# Patient Record
Sex: FEMALE | Race: WHITE | Employment: FULL TIME | URBAN - METROPOLITAN AREA
[De-identification: names, ages, dates, MRNs, and addresses within clinical notes are randomized per-mention and may not be internally consistent; named-entity substitution may affect disease eponyms.]

---

## 2017-01-05 RX ORDER — MONTELUKAST SODIUM 10 MG/1
TABLET ORAL
Qty: 30 TABLET | Refills: 2 | Status: SHIPPED | OUTPATIENT
Start: 2017-01-05 | End: 2017-04-19

## 2017-01-05 NOTE — TELEPHONE ENCOUNTER
Requesting Montelukast refill    Refill Protocol Appointment Criteria  · Appointment scheduled in the past 6 months or in the next 3 months  Recent Visits       Provider Department Primary Dx    1 month ago Sky Rai, [facility] Jaleel Paniagua

## 2017-01-20 NOTE — TELEPHONE ENCOUNTER
Pt is calling requesting a refill on medication Rylie CAPPS pt state that she is out of town and need it to go to [facility] in [address] [facility] [phone]

## 2017-01-25 RX ORDER — FEXOFENADINE HCL AND PSEUDOEPHEDRINE HCI 60; 120 MG/1; MG/1
1 TABLET, EXTENDED RELEASE ORAL 2 TIMES DAILY
Qty: 30 TABLET | Refills: 0 | Status: SHIPPED | OUTPATIENT
Start: 2017-01-25 | End: 2017-10-30

## 2017-01-30 ENCOUNTER — TELEPHONE (OUTPATIENT)
Dept: OBGYN CLINIC | Facility: CLINIC | Age: 37
End: 2017-01-30

## 2017-01-30 ENCOUNTER — TELEPHONE (OUTPATIENT)
Dept: FAMILY MEDICINE CLINIC | Facility: CLINIC | Age: 37
End: 2017-01-30

## 2017-01-30 DIAGNOSIS — B37.9 YEAST INFECTION: Primary | ICD-10-CM

## 2017-01-30 RX ORDER — FLUCONAZOLE 150 MG/1
150 TABLET ORAL ONCE
Qty: 2 TABLET | Refills: 0 | Status: SHIPPED | OUTPATIENT
Start: 2017-01-30 | End: 2017-01-30

## 2017-01-30 NOTE — TELEPHONE ENCOUNTER
Managed Care- Prior to forwarding to the doctor. Is this physician in the [facility] network?  Or should patient be referred to another allergist?

## 2017-01-30 NOTE — TELEPHONE ENCOUNTER
Dr Aylin Jin is not a participating provider. [facility] Akil Garnica.      Thank you, Cm

## 2017-01-30 NOTE — TELEPHONE ENCOUNTER
ASKING FOR REFERRAL TO AN ALLERGIST.       PATIENT HAS HMO INSURANCE     DOCTOR [facility]  PHONE # [phone]

## 2017-01-30 NOTE — TELEPHONE ENCOUNTER
[id number]. Attempted to contact the patient to inform her of Managed Care information. She will need to see in network provider Dr. Dey Said.  Why is she requesting to see an allergist?

## 2017-01-30 NOTE — TELEPHONE ENCOUNTER
Please inform patient I sent rx to her pharmacy. She should call if she does not experience significant relief after the second dose. Thanks.

## 2017-01-30 NOTE — TELEPHONE ENCOUNTER
Pt is requesting a refill of Rx Diflucan. Pt c/o vaginal itching and discomefort on the inside, white vaginal d/c and no odor. Pt was advised will send request to [facility]. Pt agrees with plan.

## 2017-01-31 NOTE — TELEPHONE ENCOUNTER
Pt returning phone call from N. ..please advise pt state that she can see Dr. Akil Garnica. Trey Rizo

## 2017-02-14 ENCOUNTER — TELEPHONE (OUTPATIENT)
Dept: FAMILY MEDICINE CLINIC | Facility: CLINIC | Age: 37
End: 2017-02-14

## 2017-02-15 ENCOUNTER — TELEPHONE (OUTPATIENT)
Dept: FAMILY MEDICINE CLINIC | Facility: CLINIC | Age: 37
End: 2017-02-15

## 2017-02-15 DIAGNOSIS — M25.562 LEFT KNEE PAIN, UNSPECIFIED CHRONICITY: Primary | ICD-10-CM

## 2017-02-15 NOTE — TELEPHONE ENCOUNTER
Reason for Call/Chief Complaint: pain in left knee when running. Pt asking for referral to ortho, Dr. Quentin San, pt state she saw this doctor when she was having foot pain.    Onset: January  Nursing Assessment/Associated Symptoms: state pain only present wh

## 2017-02-16 NOTE — TELEPHONE ENCOUNTER
Pt stating that the Dr who was referred to her doesn't assist in what she needs done. Pt now saying that she needs to be referred to a sports medicine orthopedic who works on the knee.  Pt said there were a few located in [address] [place] [address] Street which is covered by her insu

## 2017-02-22 NOTE — TELEPHONE ENCOUNTER
Spoke to pt, stated a PT ref cannot be generated without an order for PCP or ortho.  Request cancelled

## 2017-02-24 ENCOUNTER — OFFICE VISIT (OUTPATIENT)
Dept: ORTHOPEDICS CLINIC | Facility: CLINIC | Age: 37
End: 2017-02-24

## 2017-02-24 ENCOUNTER — HOSPITAL ENCOUNTER (OUTPATIENT)
Dept: GENERAL RADIOLOGY | Facility: HOSPITAL | Age: 37
Discharge: HOME | End: 2017-02-24
Attending: ORTHOPAEDIC SURGERY
Payer: COMMERCIAL

## 2017-02-24 DIAGNOSIS — M22.42 PATELLA, CHONDROMALACIA, LEFT: Primary | ICD-10-CM

## 2017-02-24 DIAGNOSIS — M25.562 LEFT KNEE PAIN, UNSPECIFIED CHRONICITY: ICD-10-CM

## 2017-02-24 PROCEDURE — 99243 OFF/OP CNSLTJ NEW/EST LOW 30: CPT | Performed by: ORTHOPAEDIC SURGERY

## 2017-02-24 PROCEDURE — 73565 X-RAY EXAM OF KNEES: CPT

## 2017-02-24 PROCEDURE — 73560 X-RAY EXAM OF KNEE 1 OR 2: CPT

## 2017-02-24 PROCEDURE — 99212 OFFICE O/P EST SF 10 MIN: CPT | Performed by: ORTHOPAEDIC SURGERY

## 2017-02-24 RX ORDER — FLUCONAZOLE 150 MG/1
TABLET ORAL
Refills: 0 | COMMUNITY
Start: 2017-02-15 | End: 2017-02-24 | Stop reason: ALTCHOICE

## 2017-02-24 NOTE — PROGRESS NOTES
2/24/2017  Ricky Oliver  109/1980  39year old   female  Jasmine Castillo MD    HPI:   Patient presents with:  Knee Pain: Left - she is a runner - onset about December - no specific injury - has pain in the anterior aspect of the knee and is rated as   2013    Comment 2013 . 1st degree perineal laceration.   Pop Shearing    OTHER SURGICAL HISTORY      Comment Tummy Tuck    OTHER SURGICAL HISTORY      Comment Urogyn repair      Family History   Problem Relation Age of Onset   • D dislocation.     ASSESSMENT AND PLAN:   Patella, chondromalacia, left  (primary encounter diagnosis)  Left knee pain, unspecified chronicity    The risks, indications, benefits, and procedures of both operative and non-operative treatment were discussed wit

## 2017-03-08 ENCOUNTER — OFFICE VISIT (OUTPATIENT)
Dept: PHYSICAL THERAPY | Facility: HOSPITAL | Age: 37
End: 2017-03-08
Attending: ORTHOPAEDIC SURGERY
Payer: COMMERCIAL

## 2017-03-08 DIAGNOSIS — M22.42 PATELLA, CHONDROMALACIA, LEFT: Primary | ICD-10-CM

## 2017-03-08 PROCEDURE — 97530 THERAPEUTIC ACTIVITIES: CPT

## 2017-03-08 PROCEDURE — 97162 PT EVAL MOD COMPLEX 30 MIN: CPT

## 2017-03-08 NOTE — PROGRESS NOTES
LOWER EXTREMITY EVALUATION:   Referring Physician: Dr. Sienna Rosales  Diagnosis: Patella, chondromalacia, left (M22.42)     Date of Onset: January 2017 Date of Service: 3/8/2017     PATIENT SUMMARY   The patient is an active 38 y/o female who presented with L i 4+/5; add 5/5; L knee ext 4+/5 (eccentric 4/5); flex 5/5; L ankle DF 5/5    Special tests: L single leg squat (medial collapse)      Today’s Treatment and Response:  Patient education provided on diagnosis; pain/edema management; foot mechanics and orthoti Date____________________    Certification From: 4/5/5145  To:6/6/2017

## 2017-03-13 ENCOUNTER — OFFICE VISIT (OUTPATIENT)
Dept: PHYSICAL THERAPY | Facility: HOSPITAL | Age: 37
End: 2017-03-13
Attending: ORTHOPAEDIC SURGERY
Payer: COMMERCIAL

## 2017-03-13 PROCEDURE — 97116 GAIT TRAINING THERAPY: CPT

## 2017-03-13 PROCEDURE — 97110 THERAPEUTIC EXERCISES: CPT

## 2017-03-13 NOTE — PROGRESS NOTES
DX: Patella, chondromalacia, left (M22.42)    Authorized # of Visits:  2         Next MD visit: none scheduled  Fall Risk: standard         Precautions: n/a           Medication Changes since last visit?: No  Subjective: \"I have no pain all the time, with

## 2017-03-15 RX ORDER — FLUCONAZOLE 150 MG/1
150 TABLET ORAL DAILY
Qty: 2 TABLET | Refills: 0 | Status: SHIPPED | OUTPATIENT
Start: 2017-03-15 | End: 2017-03-20

## 2017-03-15 RX ORDER — AZITHROMYCIN 250 MG/1
TABLET, FILM COATED ORAL
Qty: 6 TABLET | Refills: 0 | OUTPATIENT
Start: 2017-03-15

## 2017-03-15 NOTE — TELEPHONE ENCOUNTER
Left VM notifying pt prescription has been refilled and to call back if any questions or if no improvement in symptoms.

## 2017-03-15 NOTE — TELEPHONE ENCOUNTER
Actions Requested: pt requesting Diflucan refill, not Azithromax as noted in meds & orders.  States she has a yeast infection, states she had Diflucan 2 tabs prescribed for these symptoms  Situation/Background   Problem: pt thinks she has a yeast infection

## 2017-03-17 ENCOUNTER — APPOINTMENT (OUTPATIENT)
Dept: PHYSICAL THERAPY | Facility: HOSPITAL | Age: 37
End: 2017-03-17
Attending: ORTHOPAEDIC SURGERY
Payer: COMMERCIAL

## 2017-03-20 ENCOUNTER — OFFICE VISIT (OUTPATIENT)
Dept: OBGYN CLINIC | Facility: CLINIC | Age: 37
End: 2017-03-20

## 2017-03-20 VITALS
BODY MASS INDEX: 18 KG/M2 | HEART RATE: 60 BPM | DIASTOLIC BLOOD PRESSURE: 59 MMHG | SYSTOLIC BLOOD PRESSURE: 93 MMHG | WEIGHT: 102 LBS

## 2017-03-20 DIAGNOSIS — N89.8 VAGINAL DISCHARGE: Primary | ICD-10-CM

## 2017-03-20 PROCEDURE — 99212 OFFICE O/P EST SF 10 MIN: CPT | Performed by: ADVANCED PRACTICE MIDWIFE

## 2017-03-20 NOTE — PROGRESS NOTES
HPI:   Jose Coronado is a 39year old female who presents for a gyne annual physical exam.    Wt Readings from Last 3 Encounters:  03/20/17 : 102 lb (46.267 kg)  11/28/16 : 108 lb (48.988 kg)  11/28/16 : 108 lb (48.988 kg)    Body mass index is 17.78 kg/ Paternal Grandfather 79     CAD   • Cancer Paternal Grandfather    • Cancer Maternal Grandfather 36      Social History:     Smoking Status: Former Smoker                   Packs/Day: 0.50  Years: 10        Types: Cigarettes    Smokeless Status: Never Used

## 2017-03-21 ENCOUNTER — OFFICE VISIT (OUTPATIENT)
Dept: PHYSICAL THERAPY | Facility: HOSPITAL | Age: 37
End: 2017-03-21
Attending: ORTHOPAEDIC SURGERY
Payer: COMMERCIAL

## 2017-03-21 PROCEDURE — 97110 THERAPEUTIC EXERCISES: CPT

## 2017-03-21 NOTE — PROGRESS NOTES
DX: Patella, chondromalacia, left (M22.42)    Authorized # of Visits:  3         Next MD visit: none scheduled  Fall Risk: standard         Precautions: n/a           Medication Changes since last visit?: No  Subjective: \"I have no left knee pain today.  Ericka Lan

## 2017-03-22 LAB
GENITAL VAGINOSIS SCREEN: NEGATIVE
TRICHOMONAS SCREEN: NEGATIVE

## 2017-03-23 ENCOUNTER — OFFICE VISIT (OUTPATIENT)
Dept: PHYSICAL THERAPY | Facility: HOSPITAL | Age: 37
End: 2017-03-23
Attending: ORTHOPAEDIC SURGERY
Payer: COMMERCIAL

## 2017-03-23 ENCOUNTER — TELEPHONE (OUTPATIENT)
Dept: OBGYN CLINIC | Facility: CLINIC | Age: 37
End: 2017-03-23

## 2017-03-23 PROCEDURE — 97140 MANUAL THERAPY 1/> REGIONS: CPT

## 2017-03-23 PROCEDURE — 97110 THERAPEUTIC EXERCISES: CPT

## 2017-03-23 NOTE — TELEPHONE ENCOUNTER
----- Message from Cesar Eden CNM sent at 3/22/2017 12:40 PM CDT -----  Normal vaginal cultures. Recommend probiotic as we discussed during her visit. Please call to inform.

## 2017-03-23 NOTE — PROGRESS NOTES
DX: Patella, chondromalacia, left (M22.42)    Authorized # of Visits:  4         Next MD visit: none scheduled  Fall Risk: standard         Precautions: n/a           Medication Changes since last visit?: No  Subjective: \"I have no left knee pain today.  O min  Total Treatment Time: 45 min

## 2017-03-24 NOTE — TELEPHONE ENCOUNTER
Called and spoke with pt regarding normal vaginal cultures and to start taking probiotic. Pt reports that she has started probiotic and symptoms are gone. Reports no further questions.

## 2017-03-27 ENCOUNTER — TELEPHONE (OUTPATIENT)
Dept: FAMILY MEDICINE CLINIC | Facility: CLINIC | Age: 37
End: 2017-03-27

## 2017-03-27 RX ORDER — FEXOFENADINE HCL AND PSEUDOEPHEDRINE HCI 60; 120 MG/1; MG/1
1 TABLET, EXTENDED RELEASE ORAL 2 TIMES DAILY
Qty: 60 TABLET | Refills: 0 | Status: SHIPPED | OUTPATIENT
Start: 2017-03-27 | End: 2017-05-01

## 2017-03-27 NOTE — TELEPHONE ENCOUNTER
Reviewed request with pt as noted prescription entered as generic form of Allegra D. Pt states she picked up a 10-day supply of brand name med as [facility] pharmacy did not have generic for Allegra D.    Spoke with [facility] pharmacist, pharmacist states [facility] does not c

## 2017-03-27 NOTE — TELEPHONE ENCOUNTER
Notified pt prescription faxed to [facility] as requested. Pt had no further questions at this time.

## 2017-03-27 NOTE — TELEPHONE ENCOUNTER
Pt states Allegra D was sent to her pharmacy but is too expensive, Requesting to send generic to [facility] in [place]. Please Advise.

## 2017-03-28 ENCOUNTER — OFFICE VISIT (OUTPATIENT)
Dept: PHYSICAL THERAPY | Facility: HOSPITAL | Age: 37
End: 2017-03-28
Attending: ORTHOPAEDIC SURGERY
Payer: COMMERCIAL

## 2017-03-28 ENCOUNTER — TELEPHONE (OUTPATIENT)
Dept: FAMILY MEDICINE CLINIC | Facility: CLINIC | Age: 37
End: 2017-03-28

## 2017-03-28 DIAGNOSIS — M21.611 BUNION, RIGHT FOOT: Primary | ICD-10-CM

## 2017-03-28 PROCEDURE — 97110 THERAPEUTIC EXERCISES: CPT

## 2017-03-28 NOTE — TELEPHONE ENCOUNTER
Pt is requesting a referral to see Dr. Brian Mosquera for a bunion on her right foot. Would Dr. Jeri Mai like to generate this referral? Please contact the pt when this has been done.  Thank you

## 2017-03-28 NOTE — TELEPHONE ENCOUNTER
Reviewed referral request with pt. Pt states she has seen Dr. Svetlana Amador for the bunion and needs to follow up with him so would like referral as soon as possible.     Referral pended for review, please advise

## 2017-03-28 NOTE — PROGRESS NOTES
DX: Patella, chondromalacia, left (M22.42)    Authorized # of Visits:  4         Next MD visit: none scheduled  Fall Risk: standard         Precautions: n/a           Medication Changes since last visit?: No  Subjective: \"I have no left knee pain today.  I knee pain necessary to run marathon in October 2017          Charges: tx 3      Total Timed Treatment: 42 min  Total Treatment Time: 45 min

## 2017-03-30 NOTE — TELEPHONE ENCOUNTER
Looks like referral was sent via [address] [place] [address] [id number]. No further action needed.

## 2017-04-03 ENCOUNTER — OFFICE VISIT (OUTPATIENT)
Dept: PHYSICAL THERAPY | Facility: HOSPITAL | Age: 37
End: 2017-04-03
Attending: ORTHOPAEDIC SURGERY
Payer: COMMERCIAL

## 2017-04-03 PROCEDURE — 97110 THERAPEUTIC EXERCISES: CPT

## 2017-04-03 NOTE — PROGRESS NOTES
DX: Patella, chondromalacia, left (M22.42)    Authorized # of Visits: 5         Next MD visit: none scheduled  Fall Risk: standard         Precautions: n/a           Medication Changes since last visit?: No  Subjective: \"I feel sore after last visit on my Total Timed Treatment: 42 min  Total Treatment Time: 45 min

## 2017-04-05 ENCOUNTER — APPOINTMENT (OUTPATIENT)
Dept: PHYSICAL THERAPY | Facility: HOSPITAL | Age: 37
End: 2017-04-05
Attending: ORTHOPAEDIC SURGERY
Payer: COMMERCIAL

## 2017-04-20 RX ORDER — MONTELUKAST SODIUM 10 MG/1
TABLET ORAL
Qty: 30 TABLET | Refills: 2 | Status: SHIPPED | OUTPATIENT
Start: 2017-04-20 | End: 2017-07-24

## 2017-04-24 ENCOUNTER — TELEPHONE (OUTPATIENT)
Dept: FAMILY MEDICINE CLINIC | Facility: CLINIC | Age: 37
End: 2017-04-24

## 2017-04-24 NOTE — TELEPHONE ENCOUNTER
Pt calling requesting referral for allergy for continued allergies despite meds. Pt also requesting referral for possible varicose veins on right leg.   Please call when approved

## 2017-05-01 ENCOUNTER — OFFICE VISIT (OUTPATIENT)
Dept: OBGYN CLINIC | Facility: CLINIC | Age: 37
End: 2017-05-01

## 2017-05-01 VITALS
HEART RATE: 46 BPM | WEIGHT: 103 LBS | BODY MASS INDEX: 18 KG/M2 | SYSTOLIC BLOOD PRESSURE: 95 MMHG | DIASTOLIC BLOOD PRESSURE: 60 MMHG

## 2017-05-01 DIAGNOSIS — N89.8 VAGINAL DISCHARGE: Primary | ICD-10-CM

## 2017-05-01 PROCEDURE — 99213 OFFICE O/P EST LOW 20 MIN: CPT | Performed by: ADVANCED PRACTICE MIDWIFE

## 2017-05-01 NOTE — PROGRESS NOTES
HPI:   Héctor Moffett is a 39year old female who presents for a gyne problem exam. Same problem as before, feeling uncomfortable and slightly itchy with some vaginal discharge. Last visit 6 weeks ago with normal culture results. No change in symptoms.  In SHOWS MGFA, NOT MGMA   • Dementia Paternal Grandmother 80     Alzheimer's disease   • Heart Disease Paternal Grandfather 79     CAD   • Cancer Paternal Grandfather    • Cancer Maternal Grandfather 36      Social History:     Smoking Status: Former Smoker benadryl, melatonin and relaxation exercises to assist.       Luis Rollins CNM  5/1/2017  4:02 PM

## 2017-05-03 ENCOUNTER — TELEPHONE (OUTPATIENT)
Dept: OBGYN CLINIC | Facility: CLINIC | Age: 37
End: 2017-05-03

## 2017-05-03 DIAGNOSIS — B37.3 YEAST VAGINITIS: Primary | ICD-10-CM

## 2017-05-03 RX ORDER — FLUCONAZOLE 150 MG/1
150 TABLET ORAL
Qty: 2 TABLET | Refills: 0 | Status: SHIPPED | OUTPATIENT
Start: 2017-05-03 | End: 2017-05-07

## 2017-05-03 NOTE — TELEPHONE ENCOUNTER
----- Message from Venecia Blanchard CNM sent at 5/3/2017 12:00 PM CDT -----  Positive for yeast. Rx diflucan sent in with 2 doses.  Please call to inform

## 2017-05-03 NOTE — TELEPHONE ENCOUNTER
Informed pt that she is positive for yeast infection. Informed pt that a prescription has been sent to her pharmacy for diflucan. Pt verbalized understanding and agrees with plan.

## 2017-05-03 NOTE — TELEPHONE ENCOUNTER
Informed pt that she is positive for yeast infection. Informed pt that Diflucan was sent to her pharmacy. Pt verbalized understanding and agrees with plan.

## 2017-05-19 ENCOUNTER — OFFICE VISIT (OUTPATIENT)
Dept: FAMILY MEDICINE CLINIC | Facility: CLINIC | Age: 37
End: 2017-05-19

## 2017-05-19 DIAGNOSIS — I83.90 VARICOSE VEIN OF LEG: Primary | ICD-10-CM

## 2017-05-19 DIAGNOSIS — L98.9 SKIN LESION: ICD-10-CM

## 2017-05-19 PROCEDURE — 99212 OFFICE O/P EST SF 10 MIN: CPT | Performed by: FAMILY MEDICINE

## 2017-05-19 PROCEDURE — 99214 OFFICE O/P EST MOD 30 MIN: CPT | Performed by: FAMILY MEDICINE

## 2017-05-19 NOTE — PROGRESS NOTES
HPI:    Viviana Brown is a 39year old female presents to clinic with complaints of a dilated vein on her left LE. States that vein looks puffy, has been like this for awhile.  In the past month she has noticed that it is tender to touch, also she feels NIGHTLY. Disp: 30 tablet Rfl: 2   Fexofenadine-Pseudoephed ER  MG Oral Tablet 12 Hr Take 1 tablet by mouth 2 (two) times daily. Disp: 30 tablet Rfl: 0   Ascorbic Acid (VITAMIN C OR) Take 1 Package by mouth daily.  Disp:  Rfl:    Multiple Vitamins-Mine

## 2017-05-30 ENCOUNTER — TELEPHONE (OUTPATIENT)
Dept: FAMILY MEDICINE CLINIC | Facility: CLINIC | Age: 37
End: 2017-05-30

## 2017-05-30 DIAGNOSIS — M25.562 CHRONIC PAIN OF LEFT KNEE: Primary | ICD-10-CM

## 2017-05-30 DIAGNOSIS — G89.29 CHRONIC PAIN OF LEFT KNEE: Primary | ICD-10-CM

## 2017-05-30 NOTE — TELEPHONE ENCOUNTER
Patient reports that she is in need of referral to follow-up with Dr Dexter Fabry due to the completion of physical therapy.

## 2017-07-08 ENCOUNTER — TELEPHONE (OUTPATIENT)
Dept: OBGYN CLINIC | Facility: CLINIC | Age: 37
End: 2017-07-08

## 2017-07-09 RX ORDER — FLUCONAZOLE 150 MG/1
150 TABLET ORAL DAILY
Qty: 2 TABLET | Refills: 0 | Status: SHIPPED | OUTPATIENT
Start: 2017-07-09 | End: 2017-07-11

## 2017-07-21 ENCOUNTER — TELEPHONE (OUTPATIENT)
Dept: FAMILY MEDICINE CLINIC | Facility: CLINIC | Age: 37
End: 2017-07-21

## 2017-07-21 DIAGNOSIS — M79.673 PAIN OF FOOT, UNSPECIFIED LATERALITY: Primary | ICD-10-CM

## 2017-07-21 NOTE — TELEPHONE ENCOUNTER
Pt calling and stts she needs a referral for a f/u visit with her podiatrist.  Pt could not recall the name of the dr. Bety Pinon pt that the referral from previous was for dr. Luanne Pope. Pt has not set up an appt yet.   Please call once referral is place

## 2017-07-22 ENCOUNTER — OFFICE VISIT (OUTPATIENT)
Dept: FAMILY MEDICINE CLINIC | Facility: CLINIC | Age: 37
End: 2017-07-22

## 2017-07-22 ENCOUNTER — TELEPHONE (OUTPATIENT)
Dept: INTERNAL MEDICINE CLINIC | Facility: CLINIC | Age: 37
End: 2017-07-22

## 2017-07-22 VITALS
DIASTOLIC BLOOD PRESSURE: 80 MMHG | BODY MASS INDEX: 19 KG/M2 | WEIGHT: 106.63 LBS | HEART RATE: 70 BPM | SYSTOLIC BLOOD PRESSURE: 133 MMHG | RESPIRATION RATE: 16 BRPM | TEMPERATURE: 98 F

## 2017-07-22 DIAGNOSIS — M79.672 ACUTE PAIN OF LEFT FOOT: Primary | ICD-10-CM

## 2017-07-22 PROCEDURE — 99212 OFFICE O/P EST SF 10 MIN: CPT | Performed by: FAMILY MEDICINE

## 2017-07-22 PROCEDURE — 99214 OFFICE O/P EST MOD 30 MIN: CPT | Performed by: FAMILY MEDICINE

## 2017-07-22 RX ORDER — CETIRIZINE HYDROCHLORIDE 10 MG/1
10 TABLET ORAL DAILY
COMMUNITY
End: 2017-10-30

## 2017-07-22 NOTE — PROGRESS NOTES
HPI:    Patient ID: Shamir Kohler is a 44year old female. Foot Pain    The pain is present in the left foot. This is a new problem. The current episode started yesterday. There has been no history of extremity trauma (marathon training).  The problem Dorsalis pedis pulses are 2+ on the right side, and 2+ on the left side. Carotid bruit not present. Pulmonary/Chest: Effort normal and breath sounds normal. No respiratory distress.    Musculoskeletal:        Feet:    Region of discomfort as depicted

## 2017-07-22 NOTE — TELEPHONE ENCOUNTER
Actions Requested: OV appt requested for today/ appt made for today at 9:20am  Problem: left foot pain (top of foot) 10/10 on pain scale with ambulation  Onset and Timing: yesterday  Associated Symptoms: none  Aggravating by: ambulation  Alleviated by: non pain not relieved by pain medication      - Pain lasts over 7 days      - You become worse

## 2017-07-24 ENCOUNTER — HOSPITAL ENCOUNTER (OUTPATIENT)
Dept: GENERAL RADIOLOGY | Facility: HOSPITAL | Age: 37
Discharge: HOME | End: 2017-07-24
Attending: FAMILY MEDICINE
Payer: COMMERCIAL

## 2017-07-24 ENCOUNTER — TELEPHONE (OUTPATIENT)
Dept: PODIATRY CLINIC | Facility: CLINIC | Age: 37
End: 2017-07-24

## 2017-07-24 DIAGNOSIS — M79.672 ACUTE PAIN OF LEFT FOOT: ICD-10-CM

## 2017-07-24 PROCEDURE — 73630 X-RAY EXAM OF FOOT: CPT | Performed by: FAMILY MEDICINE

## 2017-07-24 NOTE — TELEPHONE ENCOUNTER
Spoke to pt. States she started having dorsal left foot pain 2 days ago, Saturday. Saw PCP and xray was ordered but not completed at this time. States she will get xray today. Rates pain 2-3/10 with walking. Denies pain while sitting.  Denies swelling or br

## 2017-07-24 NOTE — TELEPHONE ENCOUNTER
Pt currently has appt on 8/3 with SCR, asking if she can be seen sooner, states she is training for a marathon and has to follow a training schedule, pt currently has L foot pain. Pls advise thank you.

## 2017-07-25 ENCOUNTER — TELEPHONE (OUTPATIENT)
Dept: FAMILY MEDICINE CLINIC | Facility: CLINIC | Age: 37
End: 2017-07-25

## 2017-07-25 DIAGNOSIS — M79.672 ACUTE FOOT PAIN, LEFT: Primary | ICD-10-CM

## 2017-07-25 NOTE — TELEPHONE ENCOUNTER
Actions Requested: Advice regarding allergy meds. Would like Singulair changed to a different med. Also requesting MRI to diagnose her foot injury since x-ray was negative.   Problem: increased anxiety   Onset and Timing: Pt unable to pin point timing but a

## 2017-07-25 NOTE — TELEPHONE ENCOUNTER
Patient can try allegra plain 180 mg OTC to see if this works. No MRI until she is seen by ortho. Referral on chart. Call patient.

## 2017-07-25 NOTE — TELEPHONE ENCOUNTER
Spoke to pt, informed her of Dr. walters. She has an appt with podiatry tomorrow. She will wait until after the appt to consider ortho. She was instructed to try Allegra 180mg otc in place of Singulair. Pt voices understanding and agrees with plan.

## 2017-07-25 NOTE — TELEPHONE ENCOUNTER
Patient is experiencing symptoms  - changes in mood - with taking the medication listed below. Please advise.        Singulair

## 2017-07-26 ENCOUNTER — OFFICE VISIT (OUTPATIENT)
Dept: PODIATRY CLINIC | Facility: CLINIC | Age: 37
End: 2017-07-26

## 2017-07-26 ENCOUNTER — TELEPHONE (OUTPATIENT)
Dept: PODIATRY CLINIC | Facility: CLINIC | Age: 37
End: 2017-07-26

## 2017-07-26 ENCOUNTER — TELEPHONE (OUTPATIENT)
Dept: INTERNAL MEDICINE CLINIC | Facility: CLINIC | Age: 37
End: 2017-07-26

## 2017-07-26 ENCOUNTER — TELEPHONE (OUTPATIENT)
Dept: FAMILY MEDICINE CLINIC | Facility: CLINIC | Age: 37
End: 2017-07-26

## 2017-07-26 DIAGNOSIS — M84.375A STRESS FRACTURE OF METATARSAL BONE, LEFT, INITIAL ENCOUNTER: Primary | ICD-10-CM

## 2017-07-26 PROCEDURE — 99243 OFF/OP CNSLTJ NEW/EST LOW 30: CPT | Performed by: PODIATRIST

## 2017-07-26 PROCEDURE — 99212 OFFICE O/P EST SF 10 MIN: CPT | Performed by: PODIATRIST

## 2017-07-26 RX ORDER — MONTELUKAST SODIUM 10 MG/1
TABLET ORAL
Qty: 30 TABLET | Refills: 2 | Status: SHIPPED | OUTPATIENT
Start: 2017-07-26 | End: 2017-10-30

## 2017-07-26 NOTE — TELEPHONE ENCOUNTER
Pt states Didi Kellogg next appt is 8/14, Pt is requesting the Dr. Clifford Mancini to contact that Dr. Yury Recio office to see if he could get her a sooner.

## 2017-07-26 NOTE — TELEPHONE ENCOUNTER
Patient calling she was stating diagnosed this morning with stress fracture in her foot.  Training for [employer], frustrated because she wanted to see Dr Victor Hugo Styles doctor she saw last year, however Jaja Mao has no appointment until August 14 [facility]

## 2017-07-26 NOTE — TELEPHONE ENCOUNTER
Refill Protocol Appointment Criteria: Refilled per protocol    · Appointment scheduled in the past 12 months or in the next 3 months  Recent Outpatient Visits            4 days ago Acute pain of left foot    [facility] [place]

## 2017-07-26 NOTE — PROGRESS NOTES
HPI:    Patient ID: Stephanie Boggs is a 44year old female. HPI  This pleasant 41-year-old female presents as a new patient to me on consult from [address] [place] [address]. This patient's chief complaint is pain associated with the dorsal aspect of the left foot. no pain on passive motion of any of the metatarsophalangeal joints but there is a point tender spot along the course of the shaft of the seeming fourth metatarsal.  It is point tender and extremely sore.   I was not able to elicit any other areas of pain an

## 2017-07-27 NOTE — TELEPHONE ENCOUNTER
Spoke to pt and scheduled f/u with SCR for 08/02/17 at 2pm at [facility].      Pt has the following concerns:  - Should she wear a CAM boot or surgical shoe instead of a shoe with good support?  - What kind of cardiovascular activity can she do right now to stay i

## 2017-07-27 NOTE — TELEPHONE ENCOUNTER
Spoke with Ortho and was advised Lennie does Sports medicine. However they are both booked out for 3 weeks. RN states they are only able to double for clearance in extreme cases. I explained pt wants to get a second opinion for DX.  Nurse states

## 2017-07-27 NOTE — TELEPHONE ENCOUNTER
A shoe with good support should be sufficient. Any exercise that is non weight bearing. Biking swimming. Catracho Whitley  scr

## 2017-07-27 NOTE — TELEPHONE ENCOUNTER
APRIL/[id number]  would like the referral to Dr. Suk[address] at [facility]  [facility]  [facility] [address] [place] [address], [place] Phone: [phone]. Pt does not have an appt scheduled yet.  Pt states no one in her network specializes in what sh

## 2017-07-28 ENCOUNTER — APPOINTMENT (OUTPATIENT)
Dept: PHYSICAL THERAPY | Facility: HOSPITAL | Age: 37
End: 2017-07-28
Attending: FAMILY MEDICINE
Payer: COMMERCIAL

## 2017-07-28 NOTE — TELEPHONE ENCOUNTER
Pt called and she expresses concern that nobody seems to care that she has a marathon to run. She is upset that our providers can not see her in a time frame that is acceptable to her.  She feels like waiting 3 weeks to see Jaja Mao is not [place] jayden

## 2017-07-28 NOTE — TELEPHONE ENCOUNTER
Pt calling checking status of call back and referral. Pt states she needs a specialist for sports med ortho that specializes in foot since she has a possible fracture. Pt requesting call back. Please advise.  Pt requesting Dr Shahbaz Jensen help her see a special

## 2017-07-31 ENCOUNTER — TELEPHONE (OUTPATIENT)
Dept: FAMILY MEDICINE CLINIC | Facility: CLINIC | Age: 37
End: 2017-07-31

## 2017-07-31 ENCOUNTER — PATIENT MESSAGE (OUTPATIENT)
Dept: FAMILY MEDICINE CLINIC | Facility: CLINIC | Age: 37
End: 2017-07-31

## 2017-07-31 DIAGNOSIS — M79.672 LEFT FOOT PAIN: Primary | ICD-10-CM

## 2017-07-31 DIAGNOSIS — M84.375A STRESS FRACTURE OF LEFT FOOT, INITIAL ENCOUNTER: Primary | ICD-10-CM

## 2017-07-31 NOTE — TELEPHONE ENCOUNTER
Pt states that she would like a referral to see Dr. Gladys Horner. Pt states that the is for a 2nd opinion on her left foot. Patient, stated that both the doctor and RN know about this. Per pt she has an appt that is scheduled on 8-10-17. Please, call pt with any questions.

## 2017-07-31 NOTE — TELEPHONE ENCOUNTER
Pt wants to get MRI or bone scan done on left foot. Pt saw Dr. Christiano Guajardo. Does he agree to MRI or bone scan?  There is no mention in OV for these test.

## 2017-08-01 NOTE — TELEPHONE ENCOUNTER
If she is status quo or improving with present tx no reason for further imaging. If resting and becoming worse than consider MRI.  Standard xray should make this diagnosis at 2 weeks from initial pain. scr

## 2017-08-01 NOTE — TELEPHONE ENCOUNTER
Spoke to pt and informed her of SCR instructions from 07/26/17 and informed her of SCR message below. Pt will discuss any further imaging with SCR tomorrow at her appt.

## 2017-08-01 NOTE — TELEPHONE ENCOUNTER
From: Lauren Soria  To: Dasia Christine DO  Sent: 7/31/2017 9:26 AM CDT  Subject: Referral Request    I am requesting a referral to see Dr. Hanh Noonan for my foot. I already have an appointment scheduled for 08/10. Thank you.

## 2017-08-02 ENCOUNTER — OFFICE VISIT (OUTPATIENT)
Dept: PODIATRY CLINIC | Facility: CLINIC | Age: 37
End: 2017-08-02

## 2017-08-02 ENCOUNTER — APPOINTMENT (OUTPATIENT)
Dept: PHYSICAL THERAPY | Facility: HOSPITAL | Age: 37
End: 2017-08-02
Attending: FAMILY MEDICINE

## 2017-08-02 DIAGNOSIS — M84.375A STRESS FRACTURE OF LEFT FOOT, INITIAL ENCOUNTER: Primary | ICD-10-CM

## 2017-08-02 PROCEDURE — 99212 OFFICE O/P EST SF 10 MIN: CPT | Performed by: PODIATRIST

## 2017-08-02 PROCEDURE — 99213 OFFICE O/P EST LOW 20 MIN: CPT | Performed by: PODIATRIST

## 2017-08-02 NOTE — PROGRESS NOTES
HPI:    Patient ID: Rusty Ramirez is a 44year old female. HPI  This 42-year-old female continues to have left foot pain. She is dramatically improved by rest elevation ice and oral anti-inflammatories.   She has been biking in an effort to prevent we

## 2017-08-04 ENCOUNTER — TELEPHONE (OUTPATIENT)
Dept: FAMILY MEDICINE CLINIC | Facility: CLINIC | Age: 37
End: 2017-08-04

## 2017-08-04 ENCOUNTER — TELEPHONE (OUTPATIENT)
Dept: PODIATRY CLINIC | Facility: CLINIC | Age: 37
End: 2017-08-04

## 2017-08-04 ENCOUNTER — HOSPITAL ENCOUNTER (OUTPATIENT)
Dept: MRI IMAGING | Age: 37
Discharge: HOME | End: 2017-08-04
Attending: PODIATRIST
Payer: COMMERCIAL

## 2017-08-04 ENCOUNTER — APPOINTMENT (OUTPATIENT)
Dept: PHYSICAL THERAPY | Facility: HOSPITAL | Age: 37
End: 2017-08-04
Attending: FAMILY MEDICINE

## 2017-08-04 DIAGNOSIS — M84.375A STRESS FRACTURE OF LEFT FOOT, INITIAL ENCOUNTER: ICD-10-CM

## 2017-08-04 PROCEDURE — 73718 MRI LOWER EXTREMITY W/O DYE: CPT | Performed by: PODIATRIST

## 2017-08-04 NOTE — TELEPHONE ENCOUNTER
Pt calling states she had had MRI but Dr Cid Him will be on vacation for a week. Pt requesting FR or any of the [place] Drs to give her the results sooner since she is very concerned. Pt requesting call back to day if possible.

## 2017-08-04 NOTE — TELEPHONE ENCOUNTER
Call to [facility]. Informed that Dr. Eliecer Hanna is on vacation. Will be back on August 14th  Suggested she go thru her primary to get results on MRI.

## 2017-08-07 ENCOUNTER — TELEPHONE (OUTPATIENT)
Dept: PODIATRY CLINIC | Facility: CLINIC | Age: 37
End: 2017-08-07

## 2017-08-07 ENCOUNTER — PATIENT MESSAGE (OUTPATIENT)
Dept: FAMILY MEDICINE CLINIC | Facility: CLINIC | Age: 37
End: 2017-08-07

## 2017-08-07 NOTE — TELEPHONE ENCOUNTER
Did speak to patient on Friday in regards to MRI results. Pt state she wants to know what is wrong with her foot. Pt states she called the radiology dept and they contacted Dr. Martin Hansen and Dr. Martin Hansen relayed that there are not stress fractures.  This was don

## 2017-08-07 NOTE — TELEPHONE ENCOUNTER
Patient called and stated has been waiting for test results for over 1 week hasnt heard anything back-    Requesting a call back from nurse before the day is over with-

## 2017-08-07 NOTE — TELEPHONE ENCOUNTER
WOODROW pt is requesting that you read and give her results to her MRI that was be Dr. Carmencita Mcdermott. Pt has been informed already by Ortho that any treatment plan or approval for her to continue to run will have to come from the doctor that saw her.  Collin is on vac

## 2017-08-07 NOTE — TELEPHONE ENCOUNTER
Received call from phone, pt upset she is not able to get MRI results, See pt's [employer] System[employer]art message. Also noted encounter for Dr. Trisha Stokes office sent today. Please advise.

## 2017-08-07 NOTE — TELEPHONE ENCOUNTER
Pt states she does not want to wait until SCR returns to office for her foot MRI results, requesting cb from RN. Pls advise thank you.

## 2017-08-08 NOTE — TELEPHONE ENCOUNTER
Dr. Cecilia Lindsey, please see message below. Thank you. States was informed by Dr. Rosalva Mcdonald office informing her that she did not have a stress fracture, and would like to know if MRI shows anything else.  Informed pt if possible to f/u with Dr. Ellsworth Abt office t

## 2017-08-08 NOTE — TELEPHONE ENCOUNTER
I can only read the report as dictated which states that there is no stress fracture. I will have to professionally defer to the podiatrist as this is there area of expertise.

## 2017-08-08 NOTE — TELEPHONE ENCOUNTER
In speaking to patient yesterday . she wanted her MRI results released to my chart. Call today to my chart help desk. No answer Did leave voice message with request on releasing MRI result to my chart so pt may see the result.

## 2017-08-09 ENCOUNTER — APPOINTMENT (OUTPATIENT)
Dept: PHYSICAL THERAPY | Facility: HOSPITAL | Age: 37
End: 2017-08-09
Attending: FAMILY MEDICINE

## 2017-08-09 NOTE — TELEPHONE ENCOUNTER
Spoke with Pt. Name and  verified. Informed her of Dr. Cely Singh message. Pt states she has an appointment with ortho in 10 minutes, and will find out the treatment plan regarding her foot.

## 2017-08-11 ENCOUNTER — APPOINTMENT (OUTPATIENT)
Dept: PHYSICAL THERAPY | Facility: HOSPITAL | Age: 37
End: 2017-08-11
Attending: FAMILY MEDICINE

## 2017-08-12 ENCOUNTER — PATIENT MESSAGE (OUTPATIENT)
Dept: OTHER | Age: 37
End: 2017-08-12

## 2017-08-12 ENCOUNTER — TELEPHONE (OUTPATIENT)
Dept: FAMILY MEDICINE CLINIC | Facility: CLINIC | Age: 37
End: 2017-08-12

## 2017-08-12 RX ORDER — FLUCONAZOLE 150 MG/1
150 TABLET ORAL ONCE
Qty: 2 TABLET | Refills: 0 | Status: SHIPPED | OUTPATIENT
Start: 2017-08-12 | End: 2017-10-04

## 2017-08-12 NOTE — TELEPHONE ENCOUNTER
Patient is calling to request a refill for the medication listed below. Please advise.      fluconazole (DIFLUCAN) 150 MG Oral Tab

## 2017-08-12 NOTE — TELEPHONE ENCOUNTER
Actions Requested: Pt requesting medication for vaginal itching. Problem: Vaginal itching and white discharge.   Onset and Timing: 3rd day  Associated Symptoms: Odor  Aggravating by: Monastat 3 day tx but after one day caused to much itching and discontinu

## 2017-08-16 ENCOUNTER — APPOINTMENT (OUTPATIENT)
Dept: PHYSICAL THERAPY | Facility: HOSPITAL | Age: 37
End: 2017-08-16
Attending: FAMILY MEDICINE

## 2017-08-16 NOTE — PROGRESS NOTES
From: Mart Toribio  Sent: 8/14/2017 9:48 AM CDT  To: Em  [facility] Clinical Staff  Subject: RE:(No subject)    Thank you!   ----- Message -----  From: Rosie Orta DO  Sent: 8/12/2017 1:03 PM CDT  To: Ry Porras  Subject: (No subject)  I may h

## 2017-08-18 ENCOUNTER — APPOINTMENT (OUTPATIENT)
Dept: PHYSICAL THERAPY | Facility: HOSPITAL | Age: 37
End: 2017-08-18
Attending: FAMILY MEDICINE

## 2017-08-22 PROBLEM — R87.612 LOW GRADE SQUAMOUS INTRAEPITHELIAL LESION ON CYTOLOGIC SMEAR OF CERVIX (LGSIL): Status: ACTIVE | Noted: 2017-08-22

## 2017-08-22 PROBLEM — F32.81 PMDD (PREMENSTRUAL DYSPHORIC DISORDER): Status: ACTIVE | Noted: 2017-08-22

## 2017-08-22 PROBLEM — F41.8 DEPRESSION WITH ANXIETY: Status: ACTIVE | Noted: 2017-08-22

## 2017-08-23 ENCOUNTER — APPOINTMENT (OUTPATIENT)
Dept: PHYSICAL THERAPY | Facility: HOSPITAL | Age: 37
End: 2017-08-23
Attending: FAMILY MEDICINE

## 2017-08-23 NOTE — PROGRESS NOTES
Subjective:    Patient is a 44year old female who presents for \"mood stabilization\". Pt reports always being an irritable, quick to anger person, but in recent months has noticed her temper getting out of hand and that it is affecting her family.   Wo Stomatitis and mucositis, unspecified         Date Noted: 08/21/2015      Leukopenia         Date Noted: 04/01/2015      IUD check up         Date Noted: 01/09/2015      General counselling and advice on contraception         Date Noted: 10/21/2014      Va Thought Process:  normal   Thought Content:  normal   Sensorium:  person and place   Cognition:  grossly intact   Insight:  good   Judgment:  good       Assessment:  43 yo with depression/anxiety - good insight and motivated to get better - desires treat

## 2017-09-01 ENCOUNTER — OFFICE VISIT (OUTPATIENT)
Dept: FAMILY MEDICINE CLINIC | Facility: CLINIC | Age: 37
End: 2017-09-01

## 2017-09-01 VITALS
BODY MASS INDEX: 18.95 KG/M2 | DIASTOLIC BLOOD PRESSURE: 60 MMHG | HEART RATE: 59 BPM | WEIGHT: 103 LBS | HEIGHT: 62 IN | SYSTOLIC BLOOD PRESSURE: 98 MMHG

## 2017-09-01 DIAGNOSIS — M25.551 RIGHT HIP PAIN: Primary | ICD-10-CM

## 2017-09-01 PROCEDURE — 99212 OFFICE O/P EST SF 10 MIN: CPT | Performed by: PHYSICIAN ASSISTANT

## 2017-09-03 ENCOUNTER — HOSPITAL ENCOUNTER (OUTPATIENT)
Dept: MRI IMAGING | Age: 37
Discharge: HOME | End: 2017-09-03
Attending: PHYSICIAN ASSISTANT
Payer: COMMERCIAL

## 2017-09-03 ENCOUNTER — HOSPITAL ENCOUNTER (OUTPATIENT)
Dept: GENERAL RADIOLOGY | Age: 37
Discharge: HOME | End: 2017-09-03
Attending: PHYSICIAN ASSISTANT
Payer: COMMERCIAL

## 2017-09-03 DIAGNOSIS — M25.551 RIGHT HIP PAIN: ICD-10-CM

## 2017-09-03 PROCEDURE — 73721 MRI JNT OF LWR EXTRE W/O DYE: CPT | Performed by: PHYSICIAN ASSISTANT

## 2017-09-03 PROCEDURE — 73502 X-RAY EXAM HIP UNI 2-3 VIEWS: CPT | Performed by: PHYSICIAN ASSISTANT

## 2017-09-05 DIAGNOSIS — S73.191A TEAR OF RIGHT ACETABULAR LABRUM, INITIAL ENCOUNTER: Primary | ICD-10-CM

## 2017-09-05 NOTE — PROGRESS NOTES
HPI:    Patient ID: Deena Delgado is a 44year old female. Patient presents for pain occurring in right hip for past few weeks. She has been training for marathon.   Pain occurs with flexion of hip while running and resolves with rest.  She has been r Skin: Skin is warm and dry. Psychiatric: She has a normal mood and affect. Vitals reviewed. ASSESSMENT/PLAN:   Right hip pain  (primary encounter diagnosis)  -MRI right hip  -Referral for physical therapy entered.       No orders of the de

## 2017-09-08 ENCOUNTER — OFFICE VISIT (OUTPATIENT)
Dept: ORTHOPEDICS CLINIC | Facility: CLINIC | Age: 37
End: 2017-09-08

## 2017-09-08 DIAGNOSIS — S73.191A TEAR OF RIGHT ACETABULAR LABRUM, INITIAL ENCOUNTER: Primary | ICD-10-CM

## 2017-09-08 PROCEDURE — 99212 OFFICE O/P EST SF 10 MIN: CPT | Performed by: ORTHOPAEDIC SURGERY

## 2017-09-08 PROCEDURE — 99243 OFF/OP CNSLTJ NEW/EST LOW 30: CPT | Performed by: ORTHOPAEDIC SURGERY

## 2017-09-08 NOTE — PROGRESS NOTES
9/8/2017  Ricky Benito  109/1980  44year old   female  Jasmine Castillo MD    HPI:   Patient presents with:  Consult: Right hip pain- pt states she has been training for the [place] marathon since June 2017 and pain started 3 wks ago.  pt had XR and Oral Chew Tab Chew  by mouth.  Disp:  Rfl:       HISTORY:  Past Medical History:   Diagnosis Date   • Anemia    • History of pregnancy ,      x2   • Hx of pregnancy 2013     x2   • Hypoglycemia    • Raynaud phenomenon       Past Surgic distributions. The patient has 5/5 strength in tibialis anterior, gastrocsoleus complex, EHL, and FHL. The patient has 90° of hip flexion and 25° of internal and external rotation.   The patient has no pain with hip flexion, adduction, and internal rotati

## 2017-09-12 ENCOUNTER — OFFICE VISIT (OUTPATIENT)
Dept: PHYSICAL THERAPY | Age: 37
End: 2017-09-12
Attending: PHYSICIAN ASSISTANT
Payer: COMMERCIAL

## 2017-09-12 DIAGNOSIS — M25.551 RIGHT HIP PAIN: ICD-10-CM

## 2017-09-12 PROCEDURE — 97162 PT EVAL MOD COMPLEX 30 MIN: CPT

## 2017-09-12 PROCEDURE — 97110 THERAPEUTIC EXERCISES: CPT

## 2017-09-12 NOTE — PROGRESS NOTES
LOWER EXTREMITY EVALUATION:   Referring Physician: Dr. Allison Boxer  Diagnosis: Right hip pain (M25.551)      Date of Onset: 8/15/17 Date of Service: 9/12/2017     PATIENT SUMMARY   Viviana Brown is a 44year old y/o female who presents to therapy today with B 5/5  Abduction: R 4+/5 L 5/5  ER: R 4/5 L 5/5  IR: R 4+/5 L 5/5 Flexion: B 5/5  Extension: B 5/5    DF:  B 5/5  PF: B 5/5         Repeated movement testing:   Lumbar spine:  Baseline: no pain in standing/sitting  RFIS x 10 no effect  TARIK x 10 no effect and while under my care.     X___________________________________________________ Date____________________    Certification From: 4/39/6212  To:12/11/2017

## 2017-09-14 ENCOUNTER — OFFICE VISIT (OUTPATIENT)
Dept: PHYSICAL THERAPY | Age: 37
End: 2017-09-14
Attending: PHYSICIAN ASSISTANT
Payer: COMMERCIAL

## 2017-09-14 DIAGNOSIS — M25.551 RIGHT HIP PAIN: ICD-10-CM

## 2017-09-14 PROCEDURE — 97112 NEUROMUSCULAR REEDUCATION: CPT

## 2017-09-14 PROCEDURE — 97140 MANUAL THERAPY 1/> REGIONS: CPT

## 2017-09-14 NOTE — PROGRESS NOTES
Diagnosis: Right hip pain (M25.551)      Authorized # of Visits:  2/6        Insurance:[facility]   Next MD visit: not scheduled  Fall Risk: standard         Precautions: n/a           Medication Changes since last visit?: No  Subjective: Patient state her h

## 2017-09-19 ENCOUNTER — OFFICE VISIT (OUTPATIENT)
Dept: PHYSICAL THERAPY | Age: 37
End: 2017-09-19
Attending: PHYSICIAN ASSISTANT
Payer: COMMERCIAL

## 2017-09-19 DIAGNOSIS — M25.551 RIGHT HIP PAIN: ICD-10-CM

## 2017-09-19 PROCEDURE — 97110 THERAPEUTIC EXERCISES: CPT

## 2017-09-19 NOTE — PROGRESS NOTES
Diagnosis: Right hip pain (M25.551)      Authorized # of Visits:  3/6        Insurance:[facility]   Next MD visit: not scheduled  Fall Risk: standard         Precautions: n/a           Medication Changes since last visit?: No  Subjective: Patient states she

## 2017-09-21 ENCOUNTER — APPOINTMENT (OUTPATIENT)
Dept: PHYSICAL THERAPY | Age: 37
End: 2017-09-21
Attending: PHYSICIAN ASSISTANT
Payer: COMMERCIAL

## 2017-09-25 ENCOUNTER — TELEPHONE (OUTPATIENT)
Dept: PHYSICAL THERAPY | Age: 37
End: 2017-09-25

## 2017-09-26 ENCOUNTER — OFFICE VISIT (OUTPATIENT)
Dept: PHYSICAL THERAPY | Age: 37
End: 2017-09-26
Attending: PHYSICIAN ASSISTANT
Payer: COMMERCIAL

## 2017-09-26 DIAGNOSIS — M25.551 RIGHT HIP PAIN: ICD-10-CM

## 2017-09-26 PROCEDURE — 97112 NEUROMUSCULAR REEDUCATION: CPT

## 2017-09-26 PROCEDURE — 97110 THERAPEUTIC EXERCISES: CPT

## 2017-09-26 NOTE — PROGRESS NOTES
Diagnosis: Right hip pain (M25.551)      Authorized # of Visits:  4/6        Insurance:[facility]   Next MD visit: not scheduled  Fall Risk: standard         Precautions: n/a           Medication Changes since last visit?: No  Subjective: Patient states she

## 2017-09-28 ENCOUNTER — OFFICE VISIT (OUTPATIENT)
Dept: PHYSICAL THERAPY | Age: 37
End: 2017-09-28
Attending: PHYSICIAN ASSISTANT
Payer: COMMERCIAL

## 2017-09-28 ENCOUNTER — NURSE TRIAGE (OUTPATIENT)
Dept: OTHER | Age: 37
End: 2017-09-28

## 2017-09-28 DIAGNOSIS — M25.551 RIGHT HIP PAIN: ICD-10-CM

## 2017-09-28 PROCEDURE — 97110 THERAPEUTIC EXERCISES: CPT

## 2017-09-28 PROCEDURE — 97112 NEUROMUSCULAR REEDUCATION: CPT

## 2017-09-28 NOTE — PROGRESS NOTES
Diagnosis: Right hip pain (M25.551)      Authorized # of Visits:  5/6        Insurance:[facility]   Next MD visit: not scheduled  Fall Risk: standard         Precautions: n/a           Medication Changes since last visit?: No  Subjective: Patient states she

## 2017-09-28 NOTE — TELEPHONE ENCOUNTER
Action Requested: Summary for Provider     []  Critical Lab, Recommendations Needed  [x] Need Additional Advice  []   FYI    []   Need Orders  [x] Need Medications Sent to Pharmacy  []  Other     SUMMARY: pt asking for non-drowsy decongestant recommendatio

## 2017-09-28 NOTE — TELEPHONE ENCOUNTER
Reviewed doctor's medication recommendation with pt, pt advised to call back if no improvement in symptoms, agreed with plan of care.

## 2017-10-02 ENCOUNTER — TELEPHONE (OUTPATIENT)
Dept: OTHER | Age: 37
End: 2017-10-02

## 2017-10-02 DIAGNOSIS — R19.5 ABNORMAL STOOLS: Primary | ICD-10-CM

## 2017-10-02 NOTE — TELEPHONE ENCOUNTER
Action Requested: Summary for Provider     []  Critical Lab, Recommendations Needed  [x] Need Additional Advice  []   FYI    []   Need Orders  [] Need Medications Sent to Pharmacy  []  Other     SUMMARY: pt states her daughter had pinworm recently and pedi

## 2017-10-03 ENCOUNTER — LAB ENCOUNTER (OUTPATIENT)
Dept: LAB | Facility: HOSPITAL | Age: 37
End: 2017-10-03
Attending: PHYSICIAN ASSISTANT
Payer: COMMERCIAL

## 2017-10-03 ENCOUNTER — APPOINTMENT (OUTPATIENT)
Dept: PHYSICAL THERAPY | Age: 37
End: 2017-10-03
Attending: PHYSICIAN ASSISTANT
Payer: COMMERCIAL

## 2017-10-03 DIAGNOSIS — R19.5 ABNORMAL STOOLS: ICD-10-CM

## 2017-10-03 PROCEDURE — 87272 CRYPTOSPORIDIUM AG IF: CPT

## 2017-10-03 PROCEDURE — 87209 SMEAR COMPLEX STAIN: CPT

## 2017-10-03 PROCEDURE — 87329 GIARDIA AG IA: CPT

## 2017-10-03 PROCEDURE — 87177 OVA AND PARASITES SMEARS: CPT

## 2017-10-05 ENCOUNTER — OFFICE VISIT (OUTPATIENT)
Dept: PHYSICAL THERAPY | Age: 37
End: 2017-10-05
Attending: PHYSICIAN ASSISTANT
Payer: COMMERCIAL

## 2017-10-05 DIAGNOSIS — M25.551 RIGHT HIP PAIN: ICD-10-CM

## 2017-10-05 PROCEDURE — 97110 THERAPEUTIC EXERCISES: CPT

## 2017-10-05 PROCEDURE — 97112 NEUROMUSCULAR REEDUCATION: CPT

## 2017-10-05 NOTE — PROGRESS NOTES
Diagnosis: Right hip pain (M25.551)      Authorized # of Visits:  6/6        Insurance:[facility]   Next MD visit: not scheduled  Fall Risk: standard         Precautions: n/a           Medication Changes since last visit?: No  Subjective: Patient states she

## 2017-10-08 RX ORDER — FLUCONAZOLE 150 MG/1
150 TABLET ORAL ONCE
Qty: 2 TABLET | Refills: 0 | Status: SHIPPED | OUTPATIENT
Start: 2017-10-08 | End: 2017-10-08

## 2017-10-30 ENCOUNTER — OFFICE VISIT (OUTPATIENT)
Dept: FAMILY MEDICINE CLINIC | Facility: CLINIC | Age: 37
End: 2017-10-30

## 2017-10-30 VITALS
RESPIRATION RATE: 16 BRPM | TEMPERATURE: 98 F | WEIGHT: 103.38 LBS | DIASTOLIC BLOOD PRESSURE: 60 MMHG | SYSTOLIC BLOOD PRESSURE: 97 MMHG | BODY MASS INDEX: 19.02 KG/M2 | HEART RATE: 54 BPM | HEIGHT: 62 IN

## 2017-10-30 DIAGNOSIS — Z00.00 ROUTINE PHYSICAL EXAMINATION: Primary | ICD-10-CM

## 2017-10-30 PROCEDURE — 99395 PREV VISIT EST AGE 18-39: CPT | Performed by: FAMILY MEDICINE

## 2017-10-30 PROCEDURE — 90471 IMMUNIZATION ADMIN: CPT | Performed by: FAMILY MEDICINE

## 2017-10-30 PROCEDURE — 90686 IIV4 VACC NO PRSV 0.5 ML IM: CPT | Performed by: FAMILY MEDICINE

## 2017-10-30 RX ORDER — MAGNESIUM OXIDE 400 MG (241.3 MG MAGNESIUM) TABLET
100 TABLET DAILY
COMMUNITY
End: 2019-07-16

## 2017-10-30 NOTE — PROGRESS NOTES
HPI:  44 yr old female who presets for physical.  Overall, feeling well. Pt was training for the marathon and tore right labrum. Undergoing PT and is doing better. Saw 2 orthopedics. Ran 15K yesterday. Just got back from [place]. Needs flu shot.  Unsure distress. HEENT: PERRLA, conjunctive clear. Davonte ear canals clear. Davonte TMs intact with good landmarks noted. Nares patent. Oral mucous membrane moist.  Normal lips, teeth, and gums. Oropharynx normal.  Neck supple. Good ROM. No LAD.   Thyroid normal.

## 2017-11-21 ENCOUNTER — OFFICE VISIT (OUTPATIENT)
Dept: OBGYN CLINIC | Facility: CLINIC | Age: 37
End: 2017-11-21

## 2017-11-21 VITALS
DIASTOLIC BLOOD PRESSURE: 62 MMHG | HEART RATE: 80 BPM | BODY MASS INDEX: 20 KG/M2 | SYSTOLIC BLOOD PRESSURE: 95 MMHG | WEIGHT: 107 LBS

## 2017-11-21 DIAGNOSIS — B37.3 YEAST INFECTION INVOLVING THE VAGINA AND SURROUNDING AREA: ICD-10-CM

## 2017-11-21 DIAGNOSIS — Z13.220 SCREENING CHOLESTEROL LEVEL: ICD-10-CM

## 2017-11-21 DIAGNOSIS — Z86.2 HISTORY OF ANEMIA: ICD-10-CM

## 2017-11-21 DIAGNOSIS — Z11.3 SCREEN FOR STD (SEXUALLY TRANSMITTED DISEASE): ICD-10-CM

## 2017-11-21 DIAGNOSIS — Z12.4 CERVICAL CANCER SCREENING: ICD-10-CM

## 2017-11-21 DIAGNOSIS — Z86.39 HISTORY OF HYPOGLYCEMIA: ICD-10-CM

## 2017-11-21 DIAGNOSIS — Z01.419 WOMEN'S ANNUAL ROUTINE GYNECOLOGICAL EXAMINATION: Primary | ICD-10-CM

## 2017-11-21 PROCEDURE — 87210 SMEAR WET MOUNT SALINE/INK: CPT | Performed by: ADVANCED PRACTICE MIDWIFE

## 2017-11-21 PROCEDURE — 99395 PREV VISIT EST AGE 18-39: CPT | Performed by: ADVANCED PRACTICE MIDWIFE

## 2017-11-21 RX ORDER — FLUCONAZOLE 150 MG/1
150 TABLET ORAL ONCE
Qty: 2 TABLET | Refills: 0 | Status: SHIPPED | OUTPATIENT
Start: 2017-11-21 | End: 2018-01-08

## 2017-11-21 NOTE — PROGRESS NOTES
HPI:   Patient presents with:  Gyn Exam: annual, vaginal discomfort,  odor, vaginal discharge, pap 10/12/15 LSIL, HPV negative      Sarah Couch is a 44year old female. [id number] Patient's last menstrual period was 10/21/2017 (within days).   Uses Paternal Grandmother 80     Alzheimer's disease   • Heart Disease Paternal Grandfather 79     CAD   • Cancer Paternal Grandfather    • Cancer Maternal Grandfather 36      Social History: Smoking status: Former Smoker Constitutional: appears well hydrated alert and responsive no acute distress noted  Neck/Thyroid: neck is supple without adenopathy No thyromegaly  Lymphatic: no abnormal cervical, supraclavicular or axillary adenopathy is noted  Breast: normal on inspec If symptoms not resolved in 72 hours may take 2nd dose.            Imaging & Referrals:  None     11/21/2017  Herminia Duke CNM

## 2017-12-11 ENCOUNTER — OFFICE VISIT (OUTPATIENT)
Dept: FAMILY MEDICINE CLINIC | Facility: CLINIC | Age: 37
End: 2017-12-11

## 2017-12-11 VITALS
HEART RATE: 47 BPM | DIASTOLIC BLOOD PRESSURE: 61 MMHG | RESPIRATION RATE: 12 BRPM | BODY MASS INDEX: 19.84 KG/M2 | HEIGHT: 62 IN | TEMPERATURE: 98 F | SYSTOLIC BLOOD PRESSURE: 99 MMHG | WEIGHT: 107.81 LBS

## 2017-12-11 DIAGNOSIS — J01.00 ACUTE NON-RECURRENT MAXILLARY SINUSITIS: Primary | ICD-10-CM

## 2017-12-11 PROCEDURE — 99213 OFFICE O/P EST LOW 20 MIN: CPT | Performed by: FAMILY MEDICINE

## 2017-12-11 PROCEDURE — 99212 OFFICE O/P EST SF 10 MIN: CPT | Performed by: FAMILY MEDICINE

## 2017-12-11 RX ORDER — AMOXICILLIN AND CLAVULANATE POTASSIUM 875; 125 MG/1; MG/1
1 TABLET, FILM COATED ORAL 2 TIMES DAILY
Qty: 20 TABLET | Refills: 0 | Status: SHIPPED | OUTPATIENT
Start: 2017-12-11 | End: 2017-12-21

## 2017-12-11 RX ORDER — MELATONIN
3 AS NEEDED
COMMUNITY
End: 2021-09-15

## 2017-12-11 NOTE — PROGRESS NOTES
HPI:    Stephanie Boggs is a 44year old female presents to clinic with a 7-8 day history of nasal congestion, headaches, and facial pressure. Also notes dryness in the back of the throat, mild soreness. Notes fatigue and a decrease in appetite.  Denies f (CALCIUM-VITAMIN D) 500-200 MG-UNIT Oral Tab Take 1 tablet by mouth 2 (two) times daily with meals. Disp:  Rfl:    Vitamin B12 100 MCG Oral Tab Take 100 mcg by mouth daily. Disp:  Rfl:    IRON CR OR Take by mouth.  Disp:  Rfl:    Lactobacillus (PROBIOTIC AC up in 1 week if no improvement          Orders This Visit:  No orders of the defined types were placed in this encounter.       Meds This Visit:    Signed Prescriptions Disp Refills    Amoxicillin-Pot Clavulanate 875-125 MG Oral Tab 20 tablet 0      Sig: Ta

## 2017-12-18 ENCOUNTER — TELEPHONE (OUTPATIENT)
Dept: OTHER | Age: 37
End: 2017-12-18

## 2017-12-18 RX ORDER — AMOXICILLIN AND CLAVULANATE POTASSIUM 875; 125 MG/1; MG/1
1 TABLET, FILM COATED ORAL 2 TIMES DAILY
Qty: 20 TABLET | Refills: 0 | Status: SHIPPED | OUTPATIENT
Start: 2017-12-18 | End: 2017-12-28

## 2017-12-18 RX ORDER — FLUCONAZOLE 150 MG/1
150 TABLET ORAL ONCE
Qty: 1 TABLET | Refills: 0 | Status: SHIPPED | OUTPATIENT
Start: 2017-12-18 | End: 2017-12-18

## 2017-12-18 NOTE — TELEPHONE ENCOUNTER
Patient saw Dr. Matthew Marley 12/11/17 for sinusitis, but didn't take the augmentin consistently, about half the pills, and the symptoms have returned. She stated that she has pressure over her face, \"cold in the sinus,\" post-nasal drip.  She denied fever/chil

## 2017-12-18 NOTE — TELEPHONE ENCOUNTER
Needs to take abx as prescribed, consistently for them to be effective.  Augmentin and diflucan to pharmacy

## 2017-12-21 ENCOUNTER — TELEPHONE (OUTPATIENT)
Dept: OBGYN CLINIC | Facility: CLINIC | Age: 37
End: 2017-12-21

## 2017-12-21 NOTE — TELEPHONE ENCOUNTER
Spoke with pt and advised of overdue labs. Pt agreed and voiced understanding, stated she will complete soon.

## 2018-01-08 DIAGNOSIS — B37.3 YEAST INFECTION INVOLVING THE VAGINA AND SURROUNDING AREA: ICD-10-CM

## 2018-01-09 RX ORDER — FLUCONAZOLE 150 MG/1
TABLET ORAL
Qty: 2 TABLET | Refills: 0 | Status: SHIPPED | OUTPATIENT
Start: 2018-01-09 | End: 2018-04-13

## 2018-01-09 NOTE — TELEPHONE ENCOUNTER
Yes ok to fill. It looks like she was recently on antibiotics from PCP so is reasonable that may have yeast infection. If symptoms not resolved with Diflucan should make appt to see me.  Thanks, BEL

## 2018-01-09 NOTE — TELEPHONE ENCOUNTER
Spoke with pt and advised Rx for Diflucan was sent to her pharmacy and to make f/u appt if symptoms do not resolve. Pt agreed and voiced understanding.

## 2018-01-12 NOTE — TELEPHONE ENCOUNTER
Pt states she is taking zoloft & would like to continue. When she switched pharmacies her rx was discontinued. Advised of MJ instructions to f/u w/ pcp or [facility] for long term management, but rx for 2 mos will be put through for her.  Pt states she was offered

## 2018-01-12 NOTE — TELEPHONE ENCOUNTER
PER PT REQUESTING A NEW SCRIPT FOR ZOLOFT 50 MGS / PT STATE SHE HAS A NEW PHARMACY / IT'S [facility]

## 2018-01-12 NOTE — TELEPHONE ENCOUNTER
xaviertcb spoke w/ MJ. According to last visit w/ MJ 11/17, pt never started zoloft rx from 8/17 w/ [facility]. If pt has been taking zoloft, then she will refill x2 & pt should f/u w/ pcp for further management.  If pt never started zoloft, pt to contact pcp for manag

## 2018-01-13 ENCOUNTER — TELEPHONE (OUTPATIENT)
Dept: OBGYN CLINIC | Facility: CLINIC | Age: 38
End: 2018-01-13

## 2018-03-21 ENCOUNTER — TELEPHONE (OUTPATIENT)
Dept: OBGYN CLINIC | Facility: CLINIC | Age: 38
End: 2018-03-21

## 2018-03-21 ENCOUNTER — TELEPHONE (OUTPATIENT)
Dept: FAMILY MEDICINE CLINIC | Facility: CLINIC | Age: 38
End: 2018-03-21

## 2018-03-21 DIAGNOSIS — Z12.31 BREAST CANCER SCREENING BY MAMMOGRAM: Primary | ICD-10-CM

## 2018-03-21 NOTE — TELEPHONE ENCOUNTER
Patient had physical on 10/30/2017 with Dr. Andrea Mcarthur. Dr. Andrea Mcarthur please see request and advise. Thanks.

## 2018-03-27 ENCOUNTER — APPOINTMENT (OUTPATIENT)
Dept: LAB | Age: 38
End: 2018-03-27
Attending: ADVANCED PRACTICE MIDWIFE
Payer: COMMERCIAL

## 2018-03-27 ENCOUNTER — HOSPITAL ENCOUNTER (OUTPATIENT)
Dept: MAMMOGRAPHY | Age: 38
Discharge: HOME | End: 2018-03-27
Attending: FAMILY MEDICINE
Payer: COMMERCIAL

## 2018-03-27 ENCOUNTER — HOSPITAL ENCOUNTER (OUTPATIENT)
Age: 38
Discharge: LEFT BEFORE DISCHARGE | End: 2018-03-27
Payer: COMMERCIAL

## 2018-03-27 DIAGNOSIS — Z86.2 HISTORY OF ANEMIA: ICD-10-CM

## 2018-03-27 DIAGNOSIS — Z13.220 SCREENING CHOLESTEROL LEVEL: ICD-10-CM

## 2018-03-27 DIAGNOSIS — Z12.31 BREAST CANCER SCREENING BY MAMMOGRAM: ICD-10-CM

## 2018-03-27 DIAGNOSIS — Z86.39 HISTORY OF HYPOGLYCEMIA: ICD-10-CM

## 2018-03-27 LAB
CHOLEST SERPL-MCNC: 150 MG/DL (ref 110–200)
ERYTHROCYTE [DISTWIDTH] IN BLOOD BY AUTOMATED COUNT: 12.3 % (ref 11–15)
GLUCOSE SERPL-MCNC: 90 MG/DL (ref 70–99)
HCT VFR BLD AUTO: 38.3 % (ref 35–48)
HDLC SERPL-MCNC: 65 MG/DL
HGB BLD-MCNC: 13.4 G/DL (ref 12–16)
LDLC SERPL CALC-MCNC: 72 MG/DL (ref 0–99)
MCH RBC QN AUTO: 34.5 PG (ref 27–32)
MCHC RBC AUTO-ENTMCNC: 35.1 G/DL (ref 32–37)
MCV RBC AUTO: 98.2 FL (ref 80–100)
NONHDLC SERPL-MCNC: 85 MG/DL
PLATELET # BLD AUTO: 335 K/UL (ref 140–400)
PMV BLD AUTO: 9.5 FL (ref 7.4–10.3)
RBC # BLD AUTO: 3.9 M/UL (ref 3.7–5.4)
TRIGL SERPL-MCNC: 66 MG/DL (ref 1–149)
WBC # BLD AUTO: 6.7 K/UL (ref 4–11)

## 2018-03-27 PROCEDURE — 36415 COLL VENOUS BLD VENIPUNCTURE: CPT

## 2018-03-27 PROCEDURE — 82947 ASSAY GLUCOSE BLOOD QUANT: CPT

## 2018-03-27 PROCEDURE — 80061 LIPID PANEL: CPT

## 2018-03-27 PROCEDURE — 85027 COMPLETE CBC AUTOMATED: CPT

## 2018-03-27 PROCEDURE — 77067 SCR MAMMO BI INCL CAD: CPT | Performed by: FAMILY MEDICINE

## 2018-04-04 ENCOUNTER — PRIOR ORIGINAL RECORDS (OUTPATIENT)
Dept: OTHER | Age: 38
End: 2018-04-04

## 2018-04-13 ENCOUNTER — OFFICE VISIT (OUTPATIENT)
Dept: OBGYN CLINIC | Facility: CLINIC | Age: 38
End: 2018-04-13

## 2018-04-13 VITALS
WEIGHT: 105 LBS | BODY MASS INDEX: 19 KG/M2 | HEART RATE: 52 BPM | DIASTOLIC BLOOD PRESSURE: 58 MMHG | SYSTOLIC BLOOD PRESSURE: 94 MMHG

## 2018-04-13 DIAGNOSIS — N89.8 VAGINAL IRRITATION: ICD-10-CM

## 2018-04-13 DIAGNOSIS — N73.0 PID (ACUTE PELVIC INFLAMMATORY DISEASE): Primary | ICD-10-CM

## 2018-04-13 DIAGNOSIS — Z11.3 SCREENING EXAMINATION FOR STD (SEXUALLY TRANSMITTED DISEASE): ICD-10-CM

## 2018-04-13 PROCEDURE — 87210 SMEAR WET MOUNT SALINE/INK: CPT | Performed by: ADVANCED PRACTICE MIDWIFE

## 2018-04-13 PROCEDURE — 99214 OFFICE O/P EST MOD 30 MIN: CPT | Performed by: ADVANCED PRACTICE MIDWIFE

## 2018-04-13 RX ORDER — DOXYCYCLINE HYCLATE 100 MG
100 TABLET ORAL 2 TIMES DAILY
Qty: 14 TABLET | Refills: 0 | Status: SHIPPED | OUTPATIENT
Start: 2018-04-13 | End: 2018-04-20

## 2018-04-13 RX ORDER — METRONIDAZOLE 500 MG/1
500 TABLET ORAL 2 TIMES DAILY
Qty: 14 TABLET | Refills: 0 | Status: SHIPPED | OUTPATIENT
Start: 2018-04-13 | End: 2018-04-20

## 2018-04-14 NOTE — PROGRESS NOTES
[facility] Focused Gynecology Problem Exam    Darion Kellogg is a 44year old female presenting for Gyn Exam (Vaginal infection per pt irritation during intercourse x4 days per pt discomfort, no abnormal vag discharge)  .     HPI:   Patient TAB0  Ectopic0  Multiple0  Live Births2        Patient's last menstrual period was 2018 (exact date).        Past Medical History:   Diagnosis Date   • Anemia    • History of pregnancy 2013     x2   • Hx of pregnancy 2013     x2 change in bowel habits, or black or bloody stools  Genito-Urinary ROS: no dysuria, trouble voiding, or hematuria  positive for - dyspareunia and pelvic pain      PHYSICAL EXAM:   BP 94/58   Pulse 52   Wt 105 lb (47.6 kg)   LMP 03/21/2018 (Exact Date)   Alyse Take 1 tablet (500 mg total) by mouth 2 (two) times daily.          IMAGING/ REFERRALS:    None     Vianey Martinez  4/13/2018  8:45 PM

## 2018-04-15 RX ORDER — FLUCONAZOLE 150 MG/1
150 TABLET ORAL ONCE
Qty: 2 TABLET | Refills: 0 | Status: SHIPPED | OUTPATIENT
Start: 2018-04-15 | End: 2018-04-15

## 2018-05-16 ENCOUNTER — PRIOR ORIGINAL RECORDS (OUTPATIENT)
Dept: OTHER | Age: 38
End: 2018-05-16

## 2018-05-16 ENCOUNTER — MYAURORA ACCOUNT LINK (OUTPATIENT)
Dept: OTHER | Age: 38
End: 2018-05-16

## 2018-07-02 ENCOUNTER — HOSPITAL ENCOUNTER (OUTPATIENT)
Age: 38
Discharge: HOME | End: 2018-07-02
Attending: FAMILY MEDICINE
Payer: COMMERCIAL

## 2018-07-02 ENCOUNTER — NURSE TRIAGE (OUTPATIENT)
Dept: OTHER | Age: 38
End: 2018-07-02

## 2018-07-02 VITALS
RESPIRATION RATE: 22 BRPM | DIASTOLIC BLOOD PRESSURE: 62 MMHG | TEMPERATURE: 98 F | HEART RATE: 62 BPM | OXYGEN SATURATION: 100 % | SYSTOLIC BLOOD PRESSURE: 104 MMHG

## 2018-07-02 DIAGNOSIS — J06.9 VIRAL UPPER RESPIRATORY TRACT INFECTION: Primary | ICD-10-CM

## 2018-07-02 DIAGNOSIS — R10.84 GENERALIZED ABDOMINAL PAIN: ICD-10-CM

## 2018-07-02 LAB — S PYO AG THROAT QL: NEGATIVE

## 2018-07-02 PROCEDURE — 99212 OFFICE O/P EST SF 10 MIN: CPT

## 2018-07-02 PROCEDURE — 99213 OFFICE O/P EST LOW 20 MIN: CPT

## 2018-07-02 PROCEDURE — 87430 STREP A AG IA: CPT

## 2018-07-02 NOTE — ED INITIAL ASSESSMENT (HPI)
Patient with history of sinus infections comes in with complaint of a headache which started last night. Pain and pressure appears to be over the sinuses. Denies allergies. Also complains of abdominal pain which started about the same time.  Mid, lower [place]

## 2018-07-02 NOTE — TELEPHONE ENCOUNTER
Action Requested: Summary for Provider     []  Critical Lab, Recommendations Needed  [] Need Additional Advice  []   FYI    []   Need Orders  [] Need Medications Sent to Pharmacy  []  Other     SUMMARY: Spoke with patient who reports she has a constant h

## 2018-07-02 NOTE — ED PROVIDER NOTES
Patient Seen in: [address]    History   Patient presents with:  Headache    Stated Complaint: Headache and Stomach Pain     HPI    65-year-old female presents with 1 day of sinus headache and abdominal pain.   She report complaint: Headache and Stomach Pain   Other systems are as noted in HPI. Constitutional and vital signs reviewed. All other systems reviewed and negative except as noted above.     Physical Exam   ED Triage Vitals [07/02/18 1538]  BP: 104/62  Pulse: reviewed. ED Course   Labs Reviewed - No data to display    ED Course as of Jul 02 1621  ------------------------------------------------------------      [facility]       62-year-old female with signs symptoms consistent with developing URI.   At this

## 2018-07-05 ENCOUNTER — TELEPHONE (OUTPATIENT)
Dept: OTHER | Age: 38
End: 2018-07-05

## 2018-07-05 NOTE — TELEPHONE ENCOUNTER
Pt stated she called Monday for URI s/s-sent to [facility]  feels better but still have abdominal discomfort, feel bloated, having regular BM, advised to try BRAT diet and advance slowly, decline Appt wanted to try eating different first   Please reply to pool

## 2018-07-07 NOTE — TELEPHONE ENCOUNTER
Reviewed doctor's recommendations with pt. Pt states she stopped taking probiotics when symptoms started. Pt states she will start taking probiotics again and call back if no improvement in symptoms.

## 2018-08-23 ENCOUNTER — APPOINTMENT (OUTPATIENT)
Dept: LAB | Age: 38
End: 2018-08-23
Attending: FAMILY MEDICINE
Payer: COMMERCIAL

## 2018-08-23 ENCOUNTER — TELEPHONE (OUTPATIENT)
Dept: FAMILY MEDICINE CLINIC | Facility: CLINIC | Age: 38
End: 2018-08-23

## 2018-08-23 DIAGNOSIS — Z23 NEED FOR TDAP VACCINATION: ICD-10-CM

## 2018-08-23 DIAGNOSIS — Z01.84 IMMUNITY STATUS TESTING: Primary | ICD-10-CM

## 2018-08-23 PROCEDURE — 86765 RUBEOLA ANTIBODY: CPT

## 2018-08-29 ENCOUNTER — OFFICE VISIT (OUTPATIENT)
Dept: FAMILY MEDICINE CLINIC | Facility: CLINIC | Age: 38
End: 2018-08-29
Payer: COMMERCIAL

## 2018-08-29 ENCOUNTER — CHARTING TRANS (OUTPATIENT)
Dept: OTHER | Age: 38
End: 2018-08-29

## 2018-08-29 VITALS
DIASTOLIC BLOOD PRESSURE: 69 MMHG | SYSTOLIC BLOOD PRESSURE: 107 MMHG | TEMPERATURE: 98 F | HEART RATE: 52 BPM | WEIGHT: 108 LBS | BODY MASS INDEX: 20 KG/M2 | RESPIRATION RATE: 16 BRPM

## 2018-08-29 DIAGNOSIS — J02.9 PHARYNGITIS, UNSPECIFIED ETIOLOGY: Primary | ICD-10-CM

## 2018-08-29 LAB
CONTROL LINE PRESENT WITH A CLEAR BACKGROUND (YES/NO): YES YES/NO
KIT LOT #: NORMAL NUMERIC
STREP GRP A CUL-SCR: NEGATIVE

## 2018-08-29 PROCEDURE — 99212 OFFICE O/P EST SF 10 MIN: CPT | Performed by: FAMILY MEDICINE

## 2018-08-29 PROCEDURE — 87880 STREP A ASSAY W/OPTIC: CPT | Performed by: FAMILY MEDICINE

## 2018-08-29 PROCEDURE — 99213 OFFICE O/P EST LOW 20 MIN: CPT | Performed by: FAMILY MEDICINE

## 2018-08-29 NOTE — PROGRESS NOTES
HPI: Luis Concepcion is a 43year old female who presents for sore throat. Sore throat started 3-4 days ago. Concern for strep. No fever. Denies runny nose, cough, ear pain, v/d. No post-nasal drainage. Feels very scratchy. Had strep many times as a kid.   She di rhonchi      Assessment:/Plan:  Pharyngitis, unspecified etiology  (primary encounter diagnosis)     Rapid strep negative. Advised symptomatic management. Throat culture sent. Will call if positive.      Rachelle Brush, DO

## 2018-09-04 ENCOUNTER — NURSE TRIAGE (OUTPATIENT)
Dept: OTHER | Age: 38
End: 2018-09-04

## 2018-09-04 RX ORDER — AMOXICILLIN 500 MG/1
500 CAPSULE ORAL 2 TIMES DAILY
Qty: 20 CAPSULE | Refills: 0 | Status: SHIPPED | OUTPATIENT
Start: 2018-09-04 | End: 2018-09-14

## 2018-09-04 NOTE — TELEPHONE ENCOUNTER
Action Requested: Summary for Provider     []  Critical Lab, Recommendations Needed  [x] Need Additional Advice  []   FYI    []   Need Orders  [] Need Medications Sent to Pharmacy  []  Other      SUMMARY: Dr. Gabriela Diana:  Patient request abx.       Patient saw

## 2018-09-04 NOTE — TELEPHONE ENCOUNTER
Amoxicillin sent to pharmacy. Throat culture was negative from lab. Please inform her that antibiotics may not help her symptoms.

## 2018-09-10 ENCOUNTER — TELEPHONE (OUTPATIENT)
Dept: FAMILY MEDICINE CLINIC | Facility: CLINIC | Age: 38
End: 2018-09-10

## 2018-09-10 RX ORDER — AZITHROMYCIN 250 MG/1
TABLET, FILM COATED ORAL
Qty: 6 TABLET | Refills: 0 | Status: SHIPPED | OUTPATIENT
Start: 2018-09-10 | End: 2018-12-04 | Stop reason: ALTCHOICE

## 2018-09-10 NOTE — TELEPHONE ENCOUNTER
Dr Sherman Ruiz, on-call for Dr Saul Gaines, please advise. Patient was seen [address] 8/29/18, taking 9/4/18 amoxicillin, but it's not helping. Willing to see ENT as per Dr Ramiro Alatorre referral, but has insurance issue until 10/1/18.  She stated she still has symptoms of

## 2018-09-12 ENCOUNTER — TELEPHONE (OUTPATIENT)
Dept: OTHER | Age: 38
End: 2018-09-12

## 2018-09-12 RX ORDER — FLUCONAZOLE 150 MG/1
150 TABLET ORAL ONCE
Qty: 1 TABLET | Refills: 0 | Status: SHIPPED | OUTPATIENT
Start: 2018-09-12 | End: 2018-09-12

## 2018-09-12 NOTE — TELEPHONE ENCOUNTER
Spoke with patient and reports symptoms of yeast infection (white vaginal dischage with itching) since last night after Amoxicillin Rx from [facility] 9/04/18 and Z yessi from [facility] 9/10/18.     Patient requesting Rx to pharmacy for yeast infection symptoms    Diflucan

## 2018-09-13 NOTE — TELEPHONE ENCOUNTER
Pt called wanted to know the status of her medication request below. Pt was inform  send to the pharmacy. Did not see on med list so I was going to send it and then noticed  had send it but the medication default to history medication.  I called t

## 2018-09-15 RX ORDER — FLUCONAZOLE 150 MG/1
TABLET ORAL
Qty: 1 TABLET | Refills: 0 | Status: CANCELLED | OUTPATIENT
Start: 2018-09-15

## 2018-09-15 NOTE — TELEPHONE ENCOUNTER
LMTCB, transfer to triage, need to review Diflucan refill request with pt as noted med filled 9/12/18.

## 2018-09-15 NOTE — TELEPHONE ENCOUNTER
Please see refill request 9/15/18.     Patient calling back, states that she still with symptoms like whitish vaginal discharge, foul odor, no fever, no pain, today is the last day of antibiotic, took diflucan on 9/12/18  , asking for another refill for her

## 2018-09-17 RX ORDER — FLUCONAZOLE 150 MG/1
150 TABLET ORAL ONCE
Qty: 1 TABLET | Refills: 0 | OUTPATIENT
Start: 2018-09-17 | End: 2018-09-17

## 2018-09-17 NOTE — TELEPHONE ENCOUNTER
Noted no appt yet scheduled. LMTCB; CSS, upon call back please assist in scheduling appt as per SK's message below.

## 2018-09-21 ENCOUNTER — TELEPHONE (OUTPATIENT)
Dept: OTHER | Age: 38
End: 2018-09-21

## 2018-09-21 ENCOUNTER — TELEPHONE (OUTPATIENT)
Dept: OBGYN CLINIC | Facility: CLINIC | Age: 38
End: 2018-09-21

## 2018-09-21 RX ORDER — FLUCONAZOLE 150 MG/1
150 TABLET ORAL ONCE
Qty: 1 TABLET | Refills: 0 | Status: SHIPPED | OUTPATIENT
Start: 2018-09-21 | End: 2018-09-21

## 2018-09-21 NOTE — TELEPHONE ENCOUNTER
Patient was treated and it did not work. Diflucan to pharmacy, if she does not improve, she needs to be seen.

## 2018-09-21 NOTE — TELEPHONE ENCOUNTER
for Dr.Weiler  pt stated that she still is having s/sx of a yeast infection. She is having whitish  vaginal discharge. Pt stated that she has had yeast infections in the past and knows she has one.  Pt stated that she had called and was inform she nee

## 2018-09-21 NOTE — TELEPHONE ENCOUNTER
Spoke with pt who reports she has had vaginal itching and white discharge for the last 2 days. Pt would like Rx for Diflucan since OTC treatments do not work for her. Pt cannot come in for appt since she does not have insurance until 10/1.  Pt advised oleksandr

## 2018-09-21 NOTE — TELEPHONE ENCOUNTER
PER PT STATE SHE HAS A YEAST INFECTION / PT ALSO STATE SHE WILL NOT HAVE ANY INSURANCE UNTIL OCT 1ST / PT WANT TO KNOW IF [facility] WOULD PRESCRIBE SOMETHING FOR THE YEAST  INFECTION / [facility] ADV

## 2018-09-22 RX ORDER — FLUCONAZOLE 150 MG/1
150 TABLET ORAL ONCE
Qty: 2 TABLET | Refills: 0 | Status: SHIPPED | OUTPATIENT
Start: 2018-09-22 | End: 2018-09-22

## 2018-09-22 NOTE — TELEPHONE ENCOUNTER
Pt was advised [facility] has sent her Rx Diflucan to her pharmacy. Pt was advised to take 1 tablet at once and if her symptoms do not resolve she is to take the second tablet in 72 hours. Pt agrees with plan.

## 2018-11-05 ENCOUNTER — PATIENT MESSAGE (OUTPATIENT)
Dept: FAMILY MEDICINE CLINIC | Facility: CLINIC | Age: 38
End: 2018-11-05

## 2018-11-05 DIAGNOSIS — I83.813 VARICOSE VEINS OF BOTH LOWER EXTREMITIES WITH PAIN: Primary | ICD-10-CM

## 2018-11-06 NOTE — TELEPHONE ENCOUNTER
From: Brennan Tapia  To: Yael Saunders DO  Sent: 11/5/2018 9:13 AM CST  Subject: Referral Request    Dr. Emanuel Castaneda,    Do I need an appointment in order to get a referral? I want to see Dr. Sean Dunlap.  I have already been seen by him, and he diagnosed me wit

## 2018-11-06 NOTE — PROGRESS NOTES
I can't figure out how to respond to her. For some reason, my reply to patient doesn't work. Anyway, I would be happy to do the referral but I can't guarantee the doctor will be in netowrk.   Can she give us more info for the referral?  Is the doctor Rivera

## 2018-11-08 NOTE — TELEPHONE ENCOUNTER
Please see patient's email and advise. Message -----  Misty Graft From: Deidre Smith     Sent: 11/8/2018 10:04 AM CST       To: Julio Agrawal DO  Subject: RE: Referral Request    Dr. Last Kent is a vascular doctor. He is associated with [facility].

## 2018-12-04 ENCOUNTER — OFFICE VISIT (OUTPATIENT)
Dept: FAMILY MEDICINE CLINIC | Facility: CLINIC | Age: 38
End: 2018-12-04

## 2018-12-04 ENCOUNTER — HOSPITAL ENCOUNTER (OUTPATIENT)
Dept: GENERAL RADIOLOGY | Age: 38
Discharge: HOME | End: 2018-12-04
Attending: FAMILY MEDICINE
Payer: COMMERCIAL

## 2018-12-04 VITALS
TEMPERATURE: 99 F | RESPIRATION RATE: 16 BRPM | WEIGHT: 108 LBS | DIASTOLIC BLOOD PRESSURE: 60 MMHG | HEIGHT: 62 IN | HEART RATE: 50 BPM | SYSTOLIC BLOOD PRESSURE: 94 MMHG | BODY MASS INDEX: 19.88 KG/M2

## 2018-12-04 DIAGNOSIS — S69.91XA HAND INJURY, RIGHT, INITIAL ENCOUNTER: ICD-10-CM

## 2018-12-04 DIAGNOSIS — S69.91XA HAND INJURY, RIGHT, INITIAL ENCOUNTER: Primary | ICD-10-CM

## 2018-12-04 PROCEDURE — 73130 X-RAY EXAM OF HAND: CPT | Performed by: FAMILY MEDICINE

## 2018-12-04 PROCEDURE — 99212 OFFICE O/P EST SF 10 MIN: CPT | Performed by: FAMILY MEDICINE

## 2018-12-04 PROCEDURE — 99213 OFFICE O/P EST LOW 20 MIN: CPT | Performed by: FAMILY MEDICINE

## 2018-12-04 NOTE — PROGRESS NOTES
HPI:    Marcos Bacon is a 43year old female resents to clinic with concerns regarding a right hand injury. On Saturday, patient was doing a \"jumping push-up\" and notes that immediately after, her hand felt a little sore but otherwise fine.   The next TABLET(50 MG) BY MOUTH DAILY (Patient taking differently: TAKE 1.5 TABLETS (75 MG) BY MOUTH DAILY) Disp: 30 tablet Rfl: 5   ST HUTTON WORT OR  Disp:  Rfl:    Omega-3 Fatty Acids (FISH OIL BURP-LESS OR) Take by mouth.  Disp:  Rfl:    melatonin 3 MG Oral Tab T

## 2019-02-28 VITALS
HEART RATE: 56 BPM | SYSTOLIC BLOOD PRESSURE: 92 MMHG | WEIGHT: 105 LBS | RESPIRATION RATE: 16 BRPM | BODY MASS INDEX: 19.32 KG/M2 | HEIGHT: 62 IN | DIASTOLIC BLOOD PRESSURE: 54 MMHG

## 2019-02-28 VITALS
HEART RATE: 60 BPM | SYSTOLIC BLOOD PRESSURE: 104 MMHG | RESPIRATION RATE: 16 BRPM | BODY MASS INDEX: 18.95 KG/M2 | HEIGHT: 62 IN | WEIGHT: 103 LBS | DIASTOLIC BLOOD PRESSURE: 64 MMHG

## 2019-04-29 ENCOUNTER — PATIENT MESSAGE (OUTPATIENT)
Dept: FAMILY MEDICINE CLINIC | Facility: CLINIC | Age: 39
End: 2019-04-29

## 2019-04-29 ENCOUNTER — NURSE TRIAGE (OUTPATIENT)
Dept: OTHER | Age: 39
End: 2019-04-29

## 2019-04-29 RX ORDER — FLUCONAZOLE 150 MG/1
TABLET ORAL
Qty: 2 TABLET | Refills: 0 | Status: SHIPPED | OUTPATIENT
Start: 2019-04-29 | End: 2019-05-28

## 2019-04-29 NOTE — TELEPHONE ENCOUNTER
From: Josesito Cantu  To: Simon Rowell DO  Sent: 4/29/2019 9:47 AM CDT  Subject: Prescription Question    Dr. Sasha Rodarte,    I have a yeast infection. Can you please prescribe fluconaloze?      Thank you,  Eloisa Hendrix

## 2019-04-29 NOTE — TELEPHONE ENCOUNTER
Prescription has been signed and sent. Thank you for preparing the prescription. These call patient and let her know.

## 2019-04-29 NOTE — TELEPHONE ENCOUNTER
Dante Knowles   to Neil Preciado, DO           4/29/19 9:47 AM   Dr. Jeffrey Hooks,     I have a yeast infection.  Can you please prescribe fluconaloze?      Thank you,   Indira Talavera

## 2019-04-29 NOTE — TELEPHONE ENCOUNTER
Action Requested: Summary for Provider     []  Critical Lab, Recommendations Needed  [] Need Additional Advice  []   FYI    []   Need Orders  [x] Need Medications Sent to Pharmacy  []  Other     SUMMARY: Patient complaining of white vaginal discharge that

## 2019-05-09 ENCOUNTER — OFFICE VISIT (OUTPATIENT)
Dept: FAMILY MEDICINE CLINIC | Facility: CLINIC | Age: 39
End: 2019-05-09

## 2019-05-09 VITALS
RESPIRATION RATE: 16 BRPM | SYSTOLIC BLOOD PRESSURE: 94 MMHG | HEART RATE: 52 BPM | WEIGHT: 109 LBS | BODY MASS INDEX: 20 KG/M2 | TEMPERATURE: 99 F | DIASTOLIC BLOOD PRESSURE: 60 MMHG

## 2019-05-09 DIAGNOSIS — J06.9 VIRAL UPPER RESPIRATORY TRACT INFECTION: Primary | ICD-10-CM

## 2019-05-09 PROCEDURE — 99212 OFFICE O/P EST SF 10 MIN: CPT | Performed by: FAMILY MEDICINE

## 2019-05-09 PROCEDURE — 99213 OFFICE O/P EST LOW 20 MIN: CPT | Performed by: FAMILY MEDICINE

## 2019-05-09 NOTE — PROGRESS NOTES
HPI:  Cottie Dakin is a 43year old female who presents for sinus congestion, sinus drainage. Started yesterday. No fever. No fever. Has scratchy throat. No cough. No v/d. Daughter has ear infection.      PMH:    Past Medical History:   Diagnosis Date   • An aeration               No wheezes, rales or rhonchi        Assessment:/Plan:  Viral upper respiratory tract infection  (primary encounter diagnosis)     No sinus infection noted at this time. Advised symptomatic management.   Advised decongestants and Flona

## 2019-05-28 ENCOUNTER — OFFICE VISIT (OUTPATIENT)
Dept: FAMILY MEDICINE CLINIC | Facility: CLINIC | Age: 39
End: 2019-05-28

## 2019-05-28 VITALS
TEMPERATURE: 98 F | HEART RATE: 52 BPM | SYSTOLIC BLOOD PRESSURE: 104 MMHG | WEIGHT: 109.63 LBS | BODY MASS INDEX: 20.18 KG/M2 | HEIGHT: 62 IN | DIASTOLIC BLOOD PRESSURE: 72 MMHG

## 2019-05-28 DIAGNOSIS — M72.2 BILATERAL PLANTAR FASCIITIS: Primary | ICD-10-CM

## 2019-05-28 PROCEDURE — 99212 OFFICE O/P EST SF 10 MIN: CPT | Performed by: FAMILY MEDICINE

## 2019-05-28 PROCEDURE — 99213 OFFICE O/P EST LOW 20 MIN: CPT | Performed by: FAMILY MEDICINE

## 2019-05-28 NOTE — PROGRESS NOTES
HPI:    Shana Andres is a 43year old female presents to clinic with a one-month history of intermittent foot pain. Initially it was just her left foot, now it is her right foot as well.   Whenever she wakes up in the morning, patient has a lot of pain 1 TABLET(50 MG) BY MOUTH DAILY Disp: 90 tablet Rfl: 1   ST HUTTON WORT OR  Disp:  Rfl:    Omega-3 Fatty Acids (FISH OIL BURP-LESS OR) Take by mouth. Disp:  Rfl:    melatonin 3 MG Oral Tab Take 3 mg by mouth as needed.  Disp:  Rfl:    Calcium Carbonate-Vitami

## 2019-05-31 ENCOUNTER — OFFICE VISIT (OUTPATIENT)
Dept: PODIATRY CLINIC | Facility: CLINIC | Age: 39
End: 2019-05-31

## 2019-05-31 VITALS — SYSTOLIC BLOOD PRESSURE: 100 MMHG | HEART RATE: 60 BPM | DIASTOLIC BLOOD PRESSURE: 55 MMHG

## 2019-05-31 DIAGNOSIS — M72.2 PLANTAR FASCIITIS OF RIGHT FOOT: Primary | ICD-10-CM

## 2019-05-31 PROCEDURE — 20550 NJX 1 TENDON SHEATH/LIGAMENT: CPT | Performed by: PODIATRIST

## 2019-05-31 PROCEDURE — 99213 OFFICE O/P EST LOW 20 MIN: CPT | Performed by: PODIATRIST

## 2019-05-31 PROCEDURE — 99212 OFFICE O/P EST SF 10 MIN: CPT | Performed by: PODIATRIST

## 2019-05-31 NOTE — PROGRESS NOTES
HPI:    Patient ID: Josesito Cantu is a 43year old female. This pleasant 62-year-old female presents to the office today having not been seen in more than a year. She is having right heel pain. Is become a chronic and more frustrating problem.   The pain her an injection today. After review patient signed a written consent. I injected the right heel from a medial sterile approach utilizing Marcaine and Depo-Medrol 20 mg. She was given postinjection instructions she is well-informed.   We will seek the ap

## 2019-07-16 ENCOUNTER — OFFICE VISIT (OUTPATIENT)
Dept: OBGYN CLINIC | Facility: CLINIC | Age: 39
End: 2019-07-16
Payer: COMMERCIAL

## 2019-07-16 VITALS
WEIGHT: 110 LBS | SYSTOLIC BLOOD PRESSURE: 93 MMHG | HEART RATE: 54 BPM | HEIGHT: 62 IN | DIASTOLIC BLOOD PRESSURE: 59 MMHG | BODY MASS INDEX: 20.24 KG/M2

## 2019-07-16 DIAGNOSIS — Z01.419 ENCOUNTER FOR WELL WOMAN EXAM WITH ROUTINE GYNECOLOGICAL EXAM: Primary | ICD-10-CM

## 2019-07-16 PROCEDURE — 99396 PREV VISIT EST AGE 40-64: CPT | Performed by: ADVANCED PRACTICE MIDWIFE

## 2019-07-17 LAB — HPV I/H RISK 1 DNA SPEC QL NAA+PROBE: NEGATIVE

## 2019-07-19 NOTE — PROGRESS NOTES
HPI:   Dinora Dela Cruz is a 35year old female who presents for annual exam.   Doing well. Mood stable on Sertraline. Denies complaints. Monogamous relationship.   Denies DV    LPS 2017 NILM/HPV neg      Medications (Active prior to today's visit):    Curr Take by mouth. Disp:  Rfl:    melatonin 3 MG Oral Tab Take 3 mg by mouth as needed. Disp:  Rfl:    Calcium Carbonate-Vitamin D (CALCIUM-VITAMIN D) 500-200 MG-UNIT Oral Tab Take 1 tablet by mouth 2 (two) times daily with meals.  Disp:  Rfl:    IRON CR OR Sharlette Aloe GENERAL: feels well,NAD  SKIN: denies any unusual skin lesions  LUNGS: denies shortness of breath with exertion  CARDIOVASCULAR: denies chest pain on exertion  GI: denies abdominal pain,denies heartburn, denies constipation or diarrhea  : denies dysu women   -Pt elects to defer screening mammogram at this time    Discussed weight management and regular exercise  Discussed Calcium and Vitamin D to maintain bone density  Discussed screening colonoscopy starting at age 54yo.   Discussed need for contracept

## 2019-08-12 ENCOUNTER — OFFICE VISIT (OUTPATIENT)
Dept: OBGYN CLINIC | Facility: CLINIC | Age: 39
End: 2019-08-12
Payer: COMMERCIAL

## 2019-08-12 VITALS
HEART RATE: 56 BPM | HEIGHT: 62 IN | WEIGHT: 109 LBS | DIASTOLIC BLOOD PRESSURE: 60 MMHG | BODY MASS INDEX: 20.06 KG/M2 | SYSTOLIC BLOOD PRESSURE: 94 MMHG

## 2019-08-12 DIAGNOSIS — B37.3 VAGINAL YEAST INFECTION: ICD-10-CM

## 2019-08-12 DIAGNOSIS — N89.8 VAGINAL ITCHING: Primary | ICD-10-CM

## 2019-08-12 PROCEDURE — 99213 OFFICE O/P EST LOW 20 MIN: CPT | Performed by: ADVANCED PRACTICE MIDWIFE

## 2019-08-12 RX ORDER — FLUCONAZOLE 150 MG/1
150 TABLET ORAL ONCE
Qty: 2 TABLET | Refills: 0 | Status: SHIPPED | OUTPATIENT
Start: 2019-08-12 | End: 2019-08-12
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
## 2019-08-12 NOTE — PATIENT INSTRUCTIONS
What is the vagina? The vagina is part of the female genitalia. It starts from the opening, called the introitus or inner part of the labia, and ends at the opening of the uterus called the cervix. Why do vaginal infections happen?     Vaginal infecti Do not leave tampons in all night. Take sitz baths daily, if prescribed by your healthcare provider. Don't scratch. Avoid wearing nylon pantyhose or panty girdles. They trap heat and moisture, providing an ideal breeding environment for organisms.  When

## 2019-08-27 ENCOUNTER — OFFICE VISIT (OUTPATIENT)
Dept: OBGYN CLINIC | Facility: CLINIC | Age: 39
End: 2019-08-27
Payer: COMMERCIAL

## 2019-08-27 VITALS
BODY MASS INDEX: 20 KG/M2 | DIASTOLIC BLOOD PRESSURE: 70 MMHG | WEIGHT: 109.38 LBS | SYSTOLIC BLOOD PRESSURE: 112 MMHG | HEART RATE: 50 BPM

## 2019-08-27 DIAGNOSIS — Z09 FOLLOW-UP EXAM: Primary | ICD-10-CM

## 2019-08-27 PROCEDURE — 99212 OFFICE O/P EST SF 10 MIN: CPT | Performed by: ADVANCED PRACTICE MIDWIFE

## 2019-08-27 RX ORDER — FLUCONAZOLE 150 MG/1
150 TABLET ORAL ONCE
Qty: 2 TABLET | Refills: 0 | Status: SHIPPED | OUTPATIENT
Start: 2019-08-27 | End: 2019-08-27

## 2019-08-27 NOTE — PROGRESS NOTES
HPI:    Patient ID: Saurabh Patino is a 35year old female who presents for a follow up visit. Pt completed complete medication course and feels symptoms have completely resolved. Currently on menses.     HPI    Review of Systems   Constitutional: Negative Oral Tab 2 tablet 0     Sig: Take 1 tablet (150 mg total) by mouth once for 1 dose.        Imaging & Referrals:  None         HI#[id number]

## 2019-10-08 RX ORDER — FLUCONAZOLE 150 MG/1
TABLET ORAL
Qty: 2 TABLET | Refills: 0 | OUTPATIENT
Start: 2019-10-08

## 2019-10-18 ENCOUNTER — OFFICE VISIT (OUTPATIENT)
Dept: FAMILY MEDICINE CLINIC | Facility: CLINIC | Age: 39
End: 2019-10-18
Payer: COMMERCIAL

## 2019-10-18 ENCOUNTER — HOSPITAL ENCOUNTER (OUTPATIENT)
Dept: GENERAL RADIOLOGY | Age: 39
Discharge: HOME | End: 2019-10-18
Attending: FAMILY MEDICINE
Payer: COMMERCIAL

## 2019-10-18 VITALS
TEMPERATURE: 98 F | HEIGHT: 62 IN | HEART RATE: 55 BPM | SYSTOLIC BLOOD PRESSURE: 95 MMHG | DIASTOLIC BLOOD PRESSURE: 60 MMHG | WEIGHT: 108 LBS | BODY MASS INDEX: 19.88 KG/M2

## 2019-10-18 DIAGNOSIS — S89.92XA INJURY OF LEFT KNEE, INITIAL ENCOUNTER: Primary | ICD-10-CM

## 2019-10-18 DIAGNOSIS — S13.9XXA NECK SPRAIN, INITIAL ENCOUNTER: ICD-10-CM

## 2019-10-18 DIAGNOSIS — S89.92XA INJURY OF LEFT KNEE, INITIAL ENCOUNTER: ICD-10-CM

## 2019-10-18 PROCEDURE — 73564 X-RAY EXAM KNEE 4 OR MORE: CPT | Performed by: FAMILY MEDICINE

## 2019-10-18 PROCEDURE — 99214 OFFICE O/P EST MOD 30 MIN: CPT | Performed by: FAMILY MEDICINE

## 2019-10-18 RX ORDER — METAXALONE 800 MG/1
800 TABLET ORAL 3 TIMES DAILY
Qty: 30 TABLET | Refills: 1 | Status: SHIPPED | OUTPATIENT
Start: 2019-10-18 | End: 2019-10-28

## 2019-10-18 RX ORDER — IBUPROFEN 600 MG/1
600 TABLET ORAL EVERY 6 HOURS PRN
Qty: 60 TABLET | Refills: 0 | Status: SHIPPED | OUTPATIENT
Start: 2019-10-18 | End: 2020-10-07 | Stop reason: ALTCHOICE

## 2019-10-18 RX ORDER — FLUCONAZOLE 150 MG/1
TABLET ORAL
COMMUNITY
Start: 2019-08-27 | End: 2019-10-18 | Stop reason: ALTCHOICE
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
## 2019-11-08 ENCOUNTER — OFFICE VISIT (OUTPATIENT)
Dept: FAMILY MEDICINE CLINIC | Facility: CLINIC | Age: 39
End: 2019-11-08
Payer: COMMERCIAL

## 2019-11-08 VITALS
TEMPERATURE: 97 F | HEIGHT: 62 IN | BODY MASS INDEX: 19.88 KG/M2 | WEIGHT: 108 LBS | HEART RATE: 60 BPM | RESPIRATION RATE: 16 BRPM | SYSTOLIC BLOOD PRESSURE: 99 MMHG | DIASTOLIC BLOOD PRESSURE: 62 MMHG

## 2019-11-08 DIAGNOSIS — Z12.39 SCREENING FOR BREAST CANCER: Primary | ICD-10-CM

## 2019-11-08 PROCEDURE — 99213 OFFICE O/P EST LOW 20 MIN: CPT | Performed by: FAMILY MEDICINE

## 2019-11-08 NOTE — PROGRESS NOTES
HPI:    Christina Carranza is a 35year old female presents to clinic requesting a mammogram order. Read that she needed one when she was 35years old. Denies palpable lumps, masses, breast pain, skin changes.       HISTORY:  Past Medical History:   [facility] by mouth as needed. • Calcium Carbonate-Vitamin D (CALCIUM-VITAMIN D) 500-200 MG-UNIT Oral Tab Take 1 tablet by mouth 2 (two) times daily with meals. • IRON CR OR Take by mouth. • Lactobacillus (PROBIOTIC ACIDOPHILUS OR) Take by mouth.      • As

## 2019-12-03 ENCOUNTER — HOSPITAL ENCOUNTER (OUTPATIENT)
Dept: GENERAL RADIOLOGY | Age: 39
Discharge: HOME | End: 2019-12-03
Attending: PODIATRIST
Payer: COMMERCIAL

## 2019-12-03 DIAGNOSIS — M79.671 RIGHT FOOT PAIN: ICD-10-CM

## 2019-12-03 PROCEDURE — 73630 X-RAY EXAM OF FOOT: CPT | Performed by: PODIATRIST

## 2019-12-05 ENCOUNTER — HOSPITAL ENCOUNTER (OUTPATIENT)
Dept: MAMMOGRAPHY | Age: 39
Discharge: HOME | End: 2019-12-05
Attending: FAMILY MEDICINE
Payer: COMMERCIAL

## 2019-12-05 DIAGNOSIS — Z12.39 SCREENING FOR BREAST CANCER: ICD-10-CM

## 2019-12-05 PROCEDURE — 77063 BREAST TOMOSYNTHESIS BI: CPT | Performed by: FAMILY MEDICINE

## 2019-12-05 PROCEDURE — 77067 SCR MAMMO BI INCL CAD: CPT | Performed by: FAMILY MEDICINE

## 2020-01-23 ENCOUNTER — MED REC SCAN ONLY (OUTPATIENT)
Dept: FAMILY MEDICINE CLINIC | Facility: CLINIC | Age: 40
End: 2020-01-23

## 2020-04-09 ENCOUNTER — NURSE TRIAGE (OUTPATIENT)
Dept: OTHER | Age: 40
End: 2020-04-09

## 2020-04-09 NOTE — TELEPHONE ENCOUNTER
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

## 2020-04-10 NOTE — TELEPHONE ENCOUNTER
I left an extensive message for this patient. Although she does not meet criteria for testing her circumstance seems a little narrow and strained.   I told her that I can be reached between 8:00 and 1230 on tomorrow April 10, 2020, so we can talk further a

## 2020-10-07 ENCOUNTER — OFFICE VISIT (OUTPATIENT)
Dept: FAMILY MEDICINE CLINIC | Facility: CLINIC | Age: 40
End: 2020-10-07
Payer: COMMERCIAL

## 2020-10-07 ENCOUNTER — LAB ENCOUNTER (OUTPATIENT)
Dept: LAB | Age: 40
End: 2020-10-07
Attending: FAMILY MEDICINE
Payer: COMMERCIAL

## 2020-10-07 VITALS
TEMPERATURE: 99 F | HEART RATE: 78 BPM | WEIGHT: 105 LBS | BODY MASS INDEX: 19.32 KG/M2 | SYSTOLIC BLOOD PRESSURE: 99 MMHG | DIASTOLIC BLOOD PRESSURE: 63 MMHG | HEIGHT: 62 IN

## 2020-10-07 DIAGNOSIS — M25.512 ACUTE PAIN OF BOTH SHOULDERS: ICD-10-CM

## 2020-10-07 DIAGNOSIS — Z00.00 ANNUAL PHYSICAL EXAM: ICD-10-CM

## 2020-10-07 DIAGNOSIS — M54.2 NECK PAIN: ICD-10-CM

## 2020-10-07 DIAGNOSIS — M25.511 ACUTE PAIN OF BOTH SHOULDERS: ICD-10-CM

## 2020-10-07 DIAGNOSIS — Z00.00 ANNUAL PHYSICAL EXAM: Primary | ICD-10-CM

## 2020-10-07 PROCEDURE — 80061 LIPID PANEL: CPT

## 2020-10-07 PROCEDURE — 99396 PREV VISIT EST AGE 40-64: CPT | Performed by: FAMILY MEDICINE

## 2020-10-07 PROCEDURE — 80053 COMPREHEN METABOLIC PANEL: CPT

## 2020-10-07 PROCEDURE — 84443 ASSAY THYROID STIM HORMONE: CPT

## 2020-10-07 PROCEDURE — 3078F DIAST BP <80 MM HG: CPT | Performed by: FAMILY MEDICINE

## 2020-10-07 PROCEDURE — 85025 COMPLETE CBC W/AUTO DIFF WBC: CPT

## 2020-10-07 PROCEDURE — 3008F BODY MASS INDEX DOCD: CPT | Performed by: FAMILY MEDICINE

## 2020-10-07 PROCEDURE — 36415 COLL VENOUS BLD VENIPUNCTURE: CPT

## 2020-10-07 PROCEDURE — 3074F SYST BP LT 130 MM HG: CPT | Performed by: FAMILY MEDICINE

## 2020-10-07 NOTE — PROGRESS NOTES
HPI:    Antonia Garcia is a 38year old female presents to clinic for an annual physical exam.  Shoulder/neck pain-started about 6 months back. Denies known trauma or injury.   Reports constant soreness in her shoulders and neck, has taken ibuprofen and F Comment: daily    Drug use: No       Medications (Active prior to today's visit):  Current Outpatient Medications   Medication Sig Dispense Refill   • SERTRALINE HCL 50 MG Oral Tab TAKE 1 TABLET(50 MG) BY MOUTH DAILY (Patient taking differently: TAKE 1. exam  (primary encounter diagnosis)  Plan: CBC WITH DIFFERENTIAL WITH PLATELET, COMP         METABOLIC PANEL (14), TSH W REFLEX TO FREE T4,         LIPID PANEL  - Immunizations UTD   - Reinforced healthy diet, lifestyle, and exercise.   - Dentist visits Q6

## 2020-12-05 ENCOUNTER — VIRTUAL PHONE E/M (OUTPATIENT)
Dept: FAMILY MEDICINE CLINIC | Facility: CLINIC | Age: 40
End: 2020-12-05
Payer: COMMERCIAL

## 2020-12-05 DIAGNOSIS — M25.522 ELBOW PAIN, LEFT: Primary | ICD-10-CM

## 2020-12-05 PROCEDURE — 99213 OFFICE O/P EST LOW 20 MIN: CPT | Performed by: PHYSICIAN ASSISTANT

## 2020-12-05 NOTE — PROGRESS NOTES
Please note that the following visit was completed using two-way, real-time interactive audio and/or video communication.   This has been done in good reggie to provide continuity of care in the best interest of the provider-patient relationship, due to the Medications:   •  SERTRALINE HCL 50 MG Oral Tab, TAKE 1 TABLET(50 MG) BY MOUTH DAILY (Patient taking differently: TAKE 1.5 TABLET(75MG) BY MOUTH DAILY), Disp: 90 tablet, Rfl: 1  •  melatonin 3 MG Oral Tab, Take 3 mg by mouth as needed. , Disp: , Rfl:     Pa

## 2021-01-11 ENCOUNTER — OFFICE VISIT (OUTPATIENT)
Dept: FAMILY MEDICINE CLINIC | Facility: CLINIC | Age: 41
End: 2021-01-11
Payer: COMMERCIAL

## 2021-01-11 VITALS
BODY MASS INDEX: 20 KG/M2 | SYSTOLIC BLOOD PRESSURE: 104 MMHG | DIASTOLIC BLOOD PRESSURE: 63 MMHG | RESPIRATION RATE: 16 BRPM | HEART RATE: 56 BPM | WEIGHT: 108.38 LBS | TEMPERATURE: 97 F

## 2021-01-11 DIAGNOSIS — M77.12 LATERAL EPICONDYLITIS OF LEFT ELBOW: Primary | ICD-10-CM

## 2021-01-11 PROCEDURE — 99213 OFFICE O/P EST LOW 20 MIN: CPT | Performed by: FAMILY MEDICINE

## 2021-01-11 PROCEDURE — 3074F SYST BP LT 130 MM HG: CPT | Performed by: FAMILY MEDICINE

## 2021-01-11 PROCEDURE — 3078F DIAST BP <80 MM HG: CPT | Performed by: FAMILY MEDICINE

## 2021-01-11 NOTE — PATIENT INSTRUCTIONS
Understanding Lateral Epicondylitis     Tendons are strong bands of tissue that connect muscles to bones. Lateral epicondylitis affects the tendons that connect muscles in the forearm to the lateral epicondyle.  This is the bony knob on the outer side of · Taking pain medicines. Taking prescription or over-the-counter pain medicines may help reduce pain and swelling.    · Wearing a brace. This helps reduce strain on the muscles and tendons in the forearm, which may relieve symptoms.  It's very important to Wear a tennis elbow splint to let the inflamed tendon rest and heal. It must be worn correctly. It should be placed down the arm past the painful area of the elbow.  It can make symptoms worse if it's directly over the inflamed tendon. Take stress off the t

## 2021-01-11 NOTE — PROGRESS NOTES
HPI: Gladys Whitmore is a 38year old female who presents for left elbow pain. Intermittent. Happens with certain motions. Hurts with lifting weights and lifting arm up. Described as discomfort. Had telehealth visit in Spring. Took one week of Ibuprofen.      PMH

## 2021-05-27 ENCOUNTER — E-VISIT (OUTPATIENT)
Dept: TELEHEALTH | Age: 41
End: 2021-05-27

## 2021-05-27 DIAGNOSIS — N76.0 ACUTE VAGINITIS: Primary | ICD-10-CM

## 2021-05-27 PROCEDURE — 99421 OL DIG E/M SVC 5-10 MIN: CPT | Performed by: NURSE PRACTITIONER

## 2021-05-27 RX ORDER — FLUCONAZOLE 150 MG/1
TABLET ORAL
Qty: 2 TABLET | Refills: 0 | Status: SHIPPED | OUTPATIENT
Start: 2021-05-27 | End: 2021-09-15

## 2021-05-27 NOTE — PROGRESS NOTES
Refer to patient Questionnaire and responses. See MyChart messages. 10 minutes spent in direct communication with patient via [facility].

## 2021-08-17 ENCOUNTER — E-VISIT (OUTPATIENT)
Dept: TELEHEALTH | Age: 41
End: 2021-08-17

## 2021-08-17 DIAGNOSIS — N89.8 VAGINAL DISCHARGE: ICD-10-CM

## 2021-08-17 DIAGNOSIS — N89.8 VAGINA ITCHING: Primary | ICD-10-CM

## 2021-08-17 PROCEDURE — 99421 OL DIG E/M SVC 5-10 MIN: CPT | Performed by: PHYSICIAN ASSISTANT

## 2021-08-17 RX ORDER — FLUCONAZOLE 150 MG/1
TABLET ORAL
Qty: 2 TABLET | Refills: 0 | Status: SHIPPED | OUTPATIENT
Start: 2021-08-17 | End: 2021-09-15

## 2021-08-17 NOTE — PROGRESS NOTES
Radames Forte is a 42year old female. HPI:   See answers to questions above.      Current Outpatient Medications   Medication Sig Dispense Refill   • fluconazole (DIFLUCAN) 150 MG Oral Tab Take one tab day one and repeat in 72 hours if needed 2 tablet 0 AND PLAN:     Vagina itching  (primary encounter diagnosis)  Vaginal discharge        Meds & Refills for this Visit:  Requested Prescriptions     Signed Prescriptions Disp Refills   • fluconazole (DIFLUCAN) 150 MG Oral Tab 2 tablet 0     Sig: Take one tab

## 2021-09-09 ENCOUNTER — E-VISIT (OUTPATIENT)
Dept: TELEHEALTH | Age: 41
End: 2021-09-09

## 2021-09-09 DIAGNOSIS — B37.3 VULVOVAGINITIS DUE TO YEAST: Primary | ICD-10-CM

## 2021-09-09 PROCEDURE — 99421 OL DIG E/M SVC 5-10 MIN: CPT | Performed by: NURSE PRACTITIONER

## 2021-09-09 RX ORDER — FLUCONAZOLE 150 MG/1
150 TABLET ORAL ONCE
Qty: 1 TABLET | Refills: 0 | Status: SHIPPED | OUTPATIENT
Start: 2021-09-09 | End: 2021-09-09

## 2021-09-09 NOTE — PROGRESS NOTES
Patient requested an E-Visit. After reviewing history, symptoms and issuing a precription, 7 minutes of time was accrued for this visit. Please see E-Visit for further information.

## 2021-09-13 ENCOUNTER — PATIENT MESSAGE (OUTPATIENT)
Dept: FAMILY MEDICINE CLINIC | Facility: CLINIC | Age: 41
End: 2021-09-13

## 2021-09-13 ENCOUNTER — TELEPHONE (OUTPATIENT)
Dept: FAMILY MEDICINE CLINIC | Facility: CLINIC | Age: 41
End: 2021-09-13

## 2021-09-14 NOTE — TELEPHONE ENCOUNTER
From: Shemar Neff  To: Jyotsna Olson DO  Sent: 9/13/2021 9:42 AM CDT  Subject: Rx refill    Hello,  I am requesting a refill of fluconazole. I am still experiencing symptoms.  Thank you

## 2021-09-15 ENCOUNTER — OFFICE VISIT (OUTPATIENT)
Dept: FAMILY MEDICINE CLINIC | Facility: CLINIC | Age: 41
End: 2021-09-15
Payer: COMMERCIAL

## 2021-09-15 VITALS
WEIGHT: 110.19 LBS | SYSTOLIC BLOOD PRESSURE: 102 MMHG | HEART RATE: 52 BPM | DIASTOLIC BLOOD PRESSURE: 58 MMHG | HEIGHT: 62 IN | BODY MASS INDEX: 20.28 KG/M2

## 2021-09-15 DIAGNOSIS — Z12.31 ENCOUNTER FOR SCREENING MAMMOGRAM FOR MALIGNANT NEOPLASM OF BREAST: ICD-10-CM

## 2021-09-15 DIAGNOSIS — N89.8 VAGINAL DISCHARGE: Primary | ICD-10-CM

## 2021-09-15 LAB
APPEARANCE: CLEAR
BILIRUBIN: NEGATIVE
GLUCOSE (URINE DIPSTICK): NEGATIVE MG/DL
KETONES (URINE DIPSTICK): NEGATIVE MG/DL
MULTISTIX LOT#: 5077 NUMERIC
NITRITE, URINE: POSITIVE
OCCULT BLOOD: NEGATIVE
PH, URINE: 7 (ref 4.5–8)
PROTEIN (URINE DIPSTICK): NEGATIVE MG/DL
SPECIFIC GRAVITY: 1.02 (ref 1–1.03)
URINE-COLOR: YELLOW
UROBILINOGEN,SEMI-QN: 0.2 MG/DL (ref 0–1.9)

## 2021-09-15 PROCEDURE — 3008F BODY MASS INDEX DOCD: CPT | Performed by: FAMILY MEDICINE

## 2021-09-15 PROCEDURE — 99213 OFFICE O/P EST LOW 20 MIN: CPT | Performed by: FAMILY MEDICINE

## 2021-09-15 PROCEDURE — 81002 URINALYSIS NONAUTO W/O SCOPE: CPT | Performed by: FAMILY MEDICINE

## 2021-09-15 PROCEDURE — 3078F DIAST BP <80 MM HG: CPT | Performed by: FAMILY MEDICINE

## 2021-09-15 PROCEDURE — 3074F SYST BP LT 130 MM HG: CPT | Performed by: FAMILY MEDICINE

## 2021-09-15 NOTE — PROGRESS NOTES
Lenny Campos is a 42year old female. Patient presents with:  Vaginal Problem: recurrent yeast infection, would like diflucan, experiencing discharge, itching, odor      HPI:   Patient is a 68-year-old female presents today with vaginal discharge.   John shortness of breath, cough, wheezing  CARDIOVASCULAR: denies chest pain on exertion, palpitations, swelling in feet  GI: denies abdominal pain and denies heartburn, nausea or vomiting  : No Pain on urination, change in the color of urine, discharge, urin

## 2021-09-17 LAB
GENITAL VAGINOSIS SCREEN: NEGATIVE
TRICHOMONAS SCREEN: NEGATIVE

## 2021-09-23 ENCOUNTER — HOSPITAL ENCOUNTER (OUTPATIENT)
Dept: MAMMOGRAPHY | Age: 41
Discharge: HOME | End: 2021-09-23
Attending: FAMILY MEDICINE
Payer: COMMERCIAL

## 2021-09-23 DIAGNOSIS — Z12.31 ENCOUNTER FOR SCREENING MAMMOGRAM FOR MALIGNANT NEOPLASM OF BREAST: ICD-10-CM

## 2021-09-23 PROCEDURE — 77063 BREAST TOMOSYNTHESIS BI: CPT | Performed by: FAMILY MEDICINE

## 2021-09-23 PROCEDURE — 77067 SCR MAMMO BI INCL CAD: CPT | Performed by: FAMILY MEDICINE

## 2021-11-30 ENCOUNTER — OFFICE VISIT (OUTPATIENT)
Dept: OBGYN CLINIC | Facility: CLINIC | Age: 41
End: 2021-11-30
Payer: COMMERCIAL

## 2021-11-30 VITALS — DIASTOLIC BLOOD PRESSURE: 67 MMHG | HEART RATE: 63 BPM | SYSTOLIC BLOOD PRESSURE: 105 MMHG

## 2021-11-30 DIAGNOSIS — N89.8 VAGINAL ITCHING: Primary | ICD-10-CM

## 2021-11-30 PROCEDURE — 3074F SYST BP LT 130 MM HG: CPT | Performed by: ADVANCED PRACTICE MIDWIFE

## 2021-11-30 PROCEDURE — 3078F DIAST BP <80 MM HG: CPT | Performed by: ADVANCED PRACTICE MIDWIFE

## 2021-11-30 PROCEDURE — 99213 OFFICE O/P EST LOW 20 MIN: CPT | Performed by: ADVANCED PRACTICE MIDWIFE

## 2021-11-30 RX ORDER — FLUCONAZOLE 150 MG/1
150 TABLET ORAL ONCE
Qty: 2 TABLET | Refills: 0 | Status: SHIPPED | OUTPATIENT
Start: 2021-11-30 | End: 2021-11-30

## 2021-11-30 NOTE — PROGRESS NOTES
Patient presents with:  Gyn Exam: annual.   Gyn Problem: mild itching        HPI:   Patient presents with:  Gyn Exam: Vaginal discharge  Judy Clark is a female who presents for vaginal discharge/itch.     ONSET: 1 week(s) ago  PATTERN: stable    REVIEW O

## 2021-12-02 ENCOUNTER — TELEPHONE (OUTPATIENT)
Dept: OBGYN CLINIC | Facility: CLINIC | Age: 41
End: 2021-12-02

## 2021-12-02 NOTE — TELEPHONE ENCOUNTER
Notified pt of results per MES. Pt is using rx for yeast & it is helping w/ symptoms. Pt can continue to use if desires.  Pt verbalized an understanding & agrees w/ plan

## 2021-12-02 NOTE — TELEPHONE ENCOUNTER
----- Message from Refugio Fong CNM sent at 12/2/2021 12:33 PM CST -----  Vaginosis screen was negative

## 2022-04-04 RX ORDER — CIPROFLOXACIN 500 MG/1
TABLET, FILM COATED ORAL
Qty: 14 TABLET | Refills: 0 | OUTPATIENT
Start: 2022-04-04

## 2022-04-28 NOTE — TELEPHONE ENCOUNTER
----- Message from Abhilash Fernandez sent at 9/13/2021  6:32 PM CDT -----  Regarding: Rx refill  itching and discharge
Spoke with patient RE: Diflucan refill request--reports persistent vaginal itching with thick, white discharge--denies odor, vaginal bleeding, fever or abdominal pain.     Relayed to patient since she has had 2 previous e-visits for same, in office appt wou
Jeanne Fuentes(Attending)

## 2022-09-28 ENCOUNTER — HOSPITAL ENCOUNTER (OUTPATIENT)
Dept: MAMMOGRAPHY | Age: 42
Discharge: HOME | End: 2022-09-28
Attending: FAMILY MEDICINE
Payer: COMMERCIAL

## 2022-09-28 DIAGNOSIS — Z12.31 ENCOUNTER FOR SCREENING MAMMOGRAM FOR MALIGNANT NEOPLASM OF BREAST: ICD-10-CM

## 2022-09-28 PROCEDURE — 77063 BREAST TOMOSYNTHESIS BI: CPT | Performed by: FAMILY MEDICINE

## 2022-09-28 PROCEDURE — 77067 SCR MAMMO BI INCL CAD: CPT | Performed by: FAMILY MEDICINE

## 2022-10-21 ENCOUNTER — LAB ENCOUNTER (OUTPATIENT)
Dept: LAB | Age: 42
End: 2022-10-21
Attending: FAMILY MEDICINE
Payer: COMMERCIAL

## 2022-10-21 ENCOUNTER — OFFICE VISIT (OUTPATIENT)
Dept: FAMILY MEDICINE CLINIC | Facility: CLINIC | Age: 42
End: 2022-10-21
Payer: COMMERCIAL

## 2022-10-21 VITALS
BODY MASS INDEX: 20.77 KG/M2 | HEART RATE: 61 BPM | WEIGHT: 110 LBS | TEMPERATURE: 98 F | HEIGHT: 61 IN | DIASTOLIC BLOOD PRESSURE: 70 MMHG | SYSTOLIC BLOOD PRESSURE: 104 MMHG

## 2022-10-21 DIAGNOSIS — Z00.00 ROUTINE HEALTH MAINTENANCE: Primary | ICD-10-CM

## 2022-10-21 LAB
ALBUMIN SERPL-MCNC: 4.1 G/DL (ref 3.4–5)
ALBUMIN/GLOB SERPL: 1.4 {RATIO} (ref 1–2)
ALP LIVER SERPL-CCNC: 53 U/L
ALT SERPL-CCNC: 17 U/L
ANION GAP SERPL CALC-SCNC: 7 MMOL/L (ref 0–18)
AST SERPL-CCNC: 15 U/L (ref 15–37)
BASOPHILS # BLD AUTO: 0.05 X10(3) UL (ref 0–0.2)
BASOPHILS NFR BLD AUTO: 0.9 %
BILIRUB SERPL-MCNC: 0.8 MG/DL (ref 0.1–2)
BUN BLD-MCNC: 12 MG/DL (ref 7–18)
BUN/CREAT SERPL: 15.8 (ref 10–20)
CALCIUM BLD-MCNC: 9.2 MG/DL (ref 8.5–10.1)
CHLORIDE SERPL-SCNC: 102 MMOL/L (ref 98–112)
CHOLEST SERPL-MCNC: 181 MG/DL (ref ?–200)
CO2 SERPL-SCNC: 27 MMOL/L (ref 21–32)
CREAT BLD-MCNC: 0.76 MG/DL
DEPRECATED RDW RBC AUTO: 41.2 FL (ref 35.1–46.3)
EOSINOPHIL # BLD AUTO: 0.04 X10(3) UL (ref 0–0.7)
EOSINOPHIL NFR BLD AUTO: 0.8 %
ERYTHROCYTE [DISTWIDTH] IN BLOOD BY AUTOMATED COUNT: 11.2 % (ref 11–15)
FASTING PATIENT LIPID ANSWER: NO
FASTING STATUS PATIENT QL REPORTED: NO
GFR SERPLBLD BASED ON 1.73 SQ M-ARVRAT: 100 ML/MIN/1.73M2 (ref 60–?)
GLOBULIN PLAS-MCNC: 2.9 G/DL (ref 2.8–4.4)
GLUCOSE BLD-MCNC: 82 MG/DL (ref 70–99)
HCT VFR BLD AUTO: 39.9 %
HDLC SERPL-MCNC: 83 MG/DL (ref 40–59)
HGB BLD-MCNC: 13.3 G/DL
IMM GRANULOCYTES # BLD AUTO: 0.01 X10(3) UL (ref 0–1)
IMM GRANULOCYTES NFR BLD: 0.2 %
LDLC SERPL CALC-MCNC: 82 MG/DL (ref ?–100)
LYMPHOCYTES # BLD AUTO: 1.45 X10(3) UL (ref 1–4)
LYMPHOCYTES NFR BLD AUTO: 27.3 %
MCH RBC QN AUTO: 33.5 PG (ref 26–34)
MCHC RBC AUTO-ENTMCNC: 33.3 G/DL (ref 31–37)
MCV RBC AUTO: 100.5 FL
MONOCYTES # BLD AUTO: 0.62 X10(3) UL (ref 0.1–1)
MONOCYTES NFR BLD AUTO: 11.7 %
NEUTROPHILS # BLD AUTO: 3.15 X10 (3) UL (ref 1.5–7.7)
NEUTROPHILS # BLD AUTO: 3.15 X10(3) UL (ref 1.5–7.7)
NEUTROPHILS NFR BLD AUTO: 59.1 %
NONHDLC SERPL-MCNC: 98 MG/DL (ref ?–130)
OSMOLALITY SERPL CALC.SUM OF ELEC: 281 MOSM/KG (ref 275–295)
PLATELET # BLD AUTO: 335 10(3)UL (ref 150–450)
POTASSIUM SERPL-SCNC: 3.7 MMOL/L (ref 3.5–5.1)
PROT SERPL-MCNC: 7 G/DL (ref 6.4–8.2)
RBC # BLD AUTO: 3.97 X10(6)UL
SODIUM SERPL-SCNC: 136 MMOL/L (ref 136–145)
TRIGL SERPL-MCNC: 92 MG/DL (ref 30–149)
VLDLC SERPL CALC-MCNC: 14 MG/DL (ref 0–30)
WBC # BLD AUTO: 5.3 X10(3) UL (ref 4–11)

## 2022-10-21 PROCEDURE — 3008F BODY MASS INDEX DOCD: CPT | Performed by: FAMILY MEDICINE

## 2022-10-21 PROCEDURE — 3074F SYST BP LT 130 MM HG: CPT | Performed by: FAMILY MEDICINE

## 2022-10-21 PROCEDURE — 80053 COMPREHEN METABOLIC PANEL: CPT | Performed by: FAMILY MEDICINE

## 2022-10-21 PROCEDURE — 36415 COLL VENOUS BLD VENIPUNCTURE: CPT | Performed by: FAMILY MEDICINE

## 2022-10-21 PROCEDURE — 3078F DIAST BP <80 MM HG: CPT | Performed by: FAMILY MEDICINE

## 2022-10-21 PROCEDURE — 85025 COMPLETE CBC W/AUTO DIFF WBC: CPT | Performed by: FAMILY MEDICINE

## 2022-10-21 PROCEDURE — 80061 LIPID PANEL: CPT | Performed by: FAMILY MEDICINE

## 2022-10-21 PROCEDURE — 99396 PREV VISIT EST AGE 40-64: CPT | Performed by: FAMILY MEDICINE

## 2022-10-21 RX ORDER — SERTRALINE HYDROCHLORIDE 100 MG/1
100 TABLET, FILM COATED ORAL EVERY MORNING
COMMUNITY
Start: 2022-09-19

## 2022-10-21 RX ORDER — CYCLOBENZAPRINE HCL 5 MG
5 TABLET ORAL 3 TIMES DAILY PRN
Qty: 30 TABLET | Refills: 0 | Status: SHIPPED | OUTPATIENT
Start: 2022-10-21

## 2022-10-21 RX ORDER — TRIAMCINOLONE ACETONIDE 1 MG/G
CREAM TOPICAL
COMMUNITY
Start: 2022-07-25

## 2022-12-15 ENCOUNTER — E-VISIT (OUTPATIENT)
Dept: TELEHEALTH | Age: 42
End: 2022-12-15

## 2022-12-15 DIAGNOSIS — N89.8 VAGINA ITCHING: Primary | ICD-10-CM

## 2022-12-15 RX ORDER — FLUCONAZOLE 150 MG/1
TABLET ORAL
Qty: 2 TABLET | Refills: 0 | Status: SHIPPED | OUTPATIENT
Start: 2022-12-15

## 2022-12-19 ENCOUNTER — HOSPITAL ENCOUNTER (OUTPATIENT)
Age: 42
Discharge: HOME | End: 2022-12-19
Payer: COMMERCIAL

## 2022-12-19 ENCOUNTER — E-VISIT (OUTPATIENT)
Dept: FAMILY MEDICINE CLINIC | Facility: CLINIC | Age: 42
End: 2022-12-19

## 2022-12-19 VITALS
HEART RATE: 72 BPM | RESPIRATION RATE: 20 BRPM | OXYGEN SATURATION: 100 % | SYSTOLIC BLOOD PRESSURE: 103 MMHG | TEMPERATURE: 98 F | DIASTOLIC BLOOD PRESSURE: 39 MMHG

## 2022-12-19 DIAGNOSIS — N76.0 ACUTE VAGINITIS: Primary | ICD-10-CM

## 2022-12-19 DIAGNOSIS — Z02.9 ENCOUNTERS FOR ADMINISTRATIVE PURPOSES: Primary | ICD-10-CM

## 2022-12-19 PROCEDURE — 3074F SYST BP LT 130 MM HG: CPT | Performed by: NURSE PRACTITIONER

## 2022-12-19 PROCEDURE — 87106 FUNGI IDENTIFICATION YEAST: CPT | Performed by: NURSE PRACTITIONER

## 2022-12-19 PROCEDURE — 99214 OFFICE O/P EST MOD 30 MIN: CPT | Performed by: NURSE PRACTITIONER

## 2022-12-19 PROCEDURE — 87205 SMEAR GRAM STAIN: CPT | Performed by: NURSE PRACTITIONER

## 2022-12-19 PROCEDURE — 3078F DIAST BP <80 MM HG: CPT | Performed by: NURSE PRACTITIONER

## 2022-12-19 PROCEDURE — 87591 N.GONORRHOEAE DNA AMP PROB: CPT | Performed by: NURSE PRACTITIONER

## 2022-12-19 PROCEDURE — 87808 TRICHOMONAS ASSAY W/OPTIC: CPT | Performed by: NURSE PRACTITIONER

## 2022-12-19 PROCEDURE — 87491 CHLMYD TRACH DNA AMP PROBE: CPT | Performed by: NURSE PRACTITIONER

## 2022-12-19 RX ORDER — FLUCONAZOLE 150 MG/1
150 TABLET ORAL ONCE
Qty: 1 TABLET | Refills: 0 | Status: SHIPPED | OUTPATIENT
Start: 2022-12-19 | End: 2022-12-19

## 2022-12-19 RX ORDER — METRONIDAZOLE 500 MG/1
500 TABLET ORAL 2 TIMES DAILY
Qty: 14 TABLET | Refills: 0 | Status: SHIPPED | OUTPATIENT
Start: 2022-12-19 | End: 2022-12-26

## 2022-12-20 LAB
C TRACH DNA SPEC QL NAA+PROBE: NEGATIVE
N GONORRHOEA DNA SPEC QL NAA+PROBE: NEGATIVE

## 2022-12-21 LAB
GENITAL VAGINOSIS SCREEN: NEGATIVE
TRICHOMONAS SCREEN: NEGATIVE

## 2023-01-10 ENCOUNTER — OFFICE VISIT (OUTPATIENT)
Dept: OBGYN CLINIC | Facility: CLINIC | Age: 43
End: 2023-01-10
Payer: COMMERCIAL

## 2023-01-10 VITALS
SYSTOLIC BLOOD PRESSURE: 113 MMHG | BODY MASS INDEX: 21 KG/M2 | WEIGHT: 112 LBS | DIASTOLIC BLOOD PRESSURE: 73 MMHG | HEART RATE: 60 BPM

## 2023-01-10 DIAGNOSIS — T19.2XXA VAGINAL FOREIGN OBJECT, INITIAL ENCOUNTER: ICD-10-CM

## 2023-01-10 DIAGNOSIS — N89.8 VAGINAL ODOR: Primary | ICD-10-CM

## 2023-01-10 PROCEDURE — 99213 OFFICE O/P EST LOW 20 MIN: CPT | Performed by: ADVANCED PRACTICE MIDWIFE

## 2023-01-10 PROCEDURE — 3078F DIAST BP <80 MM HG: CPT | Performed by: ADVANCED PRACTICE MIDWIFE

## 2023-01-10 PROCEDURE — 3074F SYST BP LT 130 MM HG: CPT | Performed by: ADVANCED PRACTICE MIDWIFE

## 2023-01-10 RX ORDER — METRONIDAZOLE 500 MG/1
500 TABLET ORAL 2 TIMES DAILY
Qty: 14 TABLET | Refills: 0 | Status: SHIPPED | OUTPATIENT
Start: 2023-01-10 | End: 2023-01-17

## 2023-02-02 ENCOUNTER — HOSPITAL ENCOUNTER (OUTPATIENT)
Age: 43
Discharge: HOME | End: 2023-02-02
Payer: COMMERCIAL

## 2023-02-02 VITALS
SYSTOLIC BLOOD PRESSURE: 104 MMHG | DIASTOLIC BLOOD PRESSURE: 51 MMHG | RESPIRATION RATE: 19 BRPM | HEART RATE: 67 BPM | TEMPERATURE: 99 F | OXYGEN SATURATION: 100 %

## 2023-02-02 DIAGNOSIS — N94.9 VAGINAL SYMPTOM: ICD-10-CM

## 2023-02-02 DIAGNOSIS — Z71.1 FEARED CONDITION NOT DEMONSTRATED: Primary | ICD-10-CM

## 2023-02-02 PROCEDURE — 99213 OFFICE O/P EST LOW 20 MIN: CPT

## 2023-03-03 ENCOUNTER — PATIENT MESSAGE (OUTPATIENT)
Dept: FAMILY MEDICINE CLINIC | Facility: CLINIC | Age: 43
End: 2023-03-03

## 2023-03-04 ENCOUNTER — NURSE TRIAGE (OUTPATIENT)
Dept: FAMILY MEDICINE CLINIC | Facility: CLINIC | Age: 43
End: 2023-03-04

## 2023-03-16 RX ORDER — CYCLOBENZAPRINE HCL 5 MG
5 TABLET ORAL 3 TIMES DAILY PRN
Qty: 30 TABLET | Refills: 0 | Status: SHIPPED | OUTPATIENT
Start: 2023-03-16

## 2023-03-16 NOTE — TELEPHONE ENCOUNTER
Please review refill protocol failed/ no protocol  Requested Prescriptions   Pending Prescriptions Disp Refills    CYCLOBENZAPRINE 5 MG Oral Tab [Pharmacy Med Name: CYCLOBENZAPRINE 5 MG TABLET] 30 tablet 0     Sig: TAKE 1 TABLET BY MOUTH THREE TIMES A DAY AS NEEDED FOR MUSCLE SPASMS       There is no refill protocol information for this order

## 2023-04-03 ENCOUNTER — HOSPITAL ENCOUNTER (OUTPATIENT)
Dept: GENERAL RADIOLOGY | Facility: HOSPITAL | Age: 43
Discharge: HOME | End: 2023-04-03
Attending: ORTHOPAEDIC SURGERY
Payer: COMMERCIAL

## 2023-04-03 ENCOUNTER — OFFICE VISIT (OUTPATIENT)
Dept: ORTHOPEDICS CLINIC | Facility: CLINIC | Age: 43
End: 2023-04-03

## 2023-04-03 VITALS — BODY MASS INDEX: 20.39 KG/M2 | WEIGHT: 108 LBS | HEIGHT: 61 IN

## 2023-04-03 DIAGNOSIS — M54.12 RIGHT CERVICAL RADICULOPATHY: ICD-10-CM

## 2023-04-03 DIAGNOSIS — M50.30 DDD (DEGENERATIVE DISC DISEASE), CERVICAL: Primary | ICD-10-CM

## 2023-04-03 DIAGNOSIS — R52 PAIN: ICD-10-CM

## 2023-04-03 PROCEDURE — 72040 X-RAY EXAM NECK SPINE 2-3 VW: CPT | Performed by: ORTHOPAEDIC SURGERY

## 2023-04-26 ENCOUNTER — HOSPITAL ENCOUNTER (OUTPATIENT)
Dept: MRI IMAGING | Age: 43
Discharge: HOME | End: 2023-04-26
Attending: ORTHOPAEDIC SURGERY
Payer: COMMERCIAL

## 2023-04-26 DIAGNOSIS — R52 PAIN: ICD-10-CM

## 2023-04-26 DIAGNOSIS — M54.12 RIGHT CERVICAL RADICULOPATHY: ICD-10-CM

## 2023-04-26 DIAGNOSIS — M50.30 DDD (DEGENERATIVE DISC DISEASE), CERVICAL: ICD-10-CM

## 2023-04-26 PROCEDURE — 72141 MRI NECK SPINE W/O DYE: CPT | Performed by: ORTHOPAEDIC SURGERY

## 2023-08-04 ENCOUNTER — E-VISIT (OUTPATIENT)
Dept: TELEHEALTH | Age: 43
End: 2023-08-04

## 2023-08-04 DIAGNOSIS — N89.8 VAGINAL ITCHING: Primary | ICD-10-CM

## 2023-08-04 PROCEDURE — 99421 OL DIG E/M SVC 5-10 MIN: CPT | Performed by: PHYSICIAN ASSISTANT

## 2023-08-04 RX ORDER — FLUCONAZOLE 150 MG/1
150 TABLET ORAL ONCE
Qty: 1 TABLET | Refills: 0 | Status: SHIPPED | OUTPATIENT
Start: 2023-08-04 | End: 2023-08-04

## 2023-08-04 NOTE — PROGRESS NOTES
Ruy Goddard is a 39year old female. HPI:   See answers to questions above. Current Outpatient Medications   Medication Sig Dispense Refill    fluconazole (DIFLUCAN) 150 MG Oral Tab Take 1 tablet (150 mg total) by mouth once for 1 dose. 1 tablet 0    cyclobenzaprine 5 MG Oral Tab Take 1 tablet (5 mg total) by mouth 3 (three) times daily as needed for Muscle spasms. 30 tablet 0    Probiotic Product (PROBIOTIC OR) Take by mouth. sertraline 100 MG Oral Tab Take 1 tablet (100 mg total) by mouth every morning. Past Medical History:   Diagnosis Date    Anemia     History of pregnancy 2013     x2    Hx of pregnancy 2013     x2    Hypoglycemia     Raynaud phenomenon       Past Surgical History:   Procedure Laterality Date    ELECTROCARDIOGRAM, COMPLETE  1/10/14    scanned to media tab          12hr labor . 45 week 6lb 5oz Female [facility]      2013 . 1st degree perineal laceration. Amanda Alatorre    OTHER SURGICAL HISTORY      Tummy Tuck    OTHER SURGICAL HISTORY      Urogyn repair      Family History   Problem Relation Age of Onset    Depression Mother     Infectious Disease Mother         Lyme disease    Cancer Maternal Grandmother 36        PLS CONFIRM - NG SHOWS MGFA, NOT MGMA    Dementia Paternal Grandmother 80        Alzheimer's disease    Heart Disease Paternal Grandfather 79        CAD    Cancer Paternal Grandfather     Cancer Maternal Grandfather 36      Social History:  Social History     Socioeconomic History    Marital status:    Tobacco Use    Smoking status: Former     Packs/day: 0.50     Years: 10.00     Pack years: 5.00     Types: Cigarettes    Smokeless tobacco: Never   Vaping Use    Vaping Use: Never used   Substance and Sexual Activity    Alcohol use:  Yes     Alcohol/week: 1.0 - 2.0 standard drink of alcohol     Types: 1 - 2 Glasses of wine per week     Comment: daily    Drug use: No   Other Topics Concern Caffeine Concern Yes     Comment: 2 cups of coffee daily 1 cup of soda daily and tea once rarely         ASSESSMENT AND PLAN:     Vaginal itching  (primary encounter diagnosis)        Meds & Refills for this Visit:  Requested Prescriptions     Signed Prescriptions Disp Refills    fluconazole (DIFLUCAN) 150 MG Oral Tab 1 tablet 0     Sig: Take 1 tablet (150 mg total) by mouth once for 1 dose.        Duration of  the service:  6 minutes    Patient advised to follow up with PCP if no improvement or worsening of symptoms  Refer to MyChart message for specific patient instructions

## 2023-08-30 ENCOUNTER — TELEPHONE (OUTPATIENT)
Dept: FAMILY MEDICINE CLINIC | Facility: CLINIC | Age: 43
End: 2023-08-30

## 2023-08-30 DIAGNOSIS — Z12.31 ENCOUNTER FOR SCREENING MAMMOGRAM FOR MALIGNANT NEOPLASM OF BREAST: Primary | ICD-10-CM

## 2023-10-18 ENCOUNTER — HOSPITAL ENCOUNTER (OUTPATIENT)
Dept: MAMMOGRAPHY | Age: 43
Discharge: HOME | End: 2023-10-18
Attending: FAMILY MEDICINE
Payer: COMMERCIAL

## 2023-10-18 DIAGNOSIS — Z12.31 ENCOUNTER FOR SCREENING MAMMOGRAM FOR MALIGNANT NEOPLASM OF BREAST: ICD-10-CM

## 2023-10-18 PROCEDURE — 77067 SCR MAMMO BI INCL CAD: CPT | Performed by: FAMILY MEDICINE

## 2023-10-18 PROCEDURE — 77063 BREAST TOMOSYNTHESIS BI: CPT | Performed by: FAMILY MEDICINE

## 2024-08-12 ENCOUNTER — HOSPITAL ENCOUNTER (OUTPATIENT)
Age: 44
Discharge: HOME | End: 2024-08-12
Payer: COMMERCIAL

## 2024-08-12 VITALS
TEMPERATURE: 98 F | SYSTOLIC BLOOD PRESSURE: 110 MMHG | RESPIRATION RATE: 16 BRPM | OXYGEN SATURATION: 100 % | HEART RATE: 50 BPM | DIASTOLIC BLOOD PRESSURE: 62 MMHG

## 2024-08-12 DIAGNOSIS — B34.9 VIRAL SYNDROME: Primary | ICD-10-CM

## 2024-08-12 DIAGNOSIS — R11.0 NAUSEA: ICD-10-CM

## 2024-08-12 DIAGNOSIS — Z20.822 ENCOUNTER FOR LABORATORY TESTING FOR COVID-19 VIRUS: ICD-10-CM

## 2024-08-12 LAB
B-HCG UR QL: NEGATIVE
S PYO AG THROAT QL: NEGATIVE
SARS-COV-2 RNA RESP QL NAA+PROBE: NOT DETECTED

## 2024-08-12 PROCEDURE — 87880 STREP A ASSAY W/OPTIC: CPT | Performed by: NURSE PRACTITIONER

## 2024-08-12 PROCEDURE — 81025 URINE PREGNANCY TEST: CPT | Performed by: NURSE PRACTITIONER

## 2024-08-12 PROCEDURE — U0002 COVID-19 LAB TEST NON-CDC: HCPCS | Performed by: NURSE PRACTITIONER

## 2024-08-12 PROCEDURE — 99214 OFFICE O/P EST MOD 30 MIN: CPT | Performed by: NURSE PRACTITIONER

## 2024-08-12 RX ORDER — ONDANSETRON 4 MG/1
4 TABLET, ORALLY DISINTEGRATING ORAL EVERY 4 HOURS PRN
Qty: 10 TABLET | Refills: 0 | Status: SHIPPED | OUTPATIENT
Start: 2024-08-12 | End: 2024-08-19

## 2024-08-12 NOTE — ED INITIAL ASSESSMENT (HPI)
Pt came in due to headache, sinus pressure, congestion, runny nose, nausea and scratchy throat for the past week. Pt has easy non labored respirations.

## 2024-08-12 NOTE — ED PROVIDER NOTES
Patient Seen in: [facility]      History     Chief Complaint   Patient presents with    Headache     Stated Complaint: nasuea, headache    Subjective:   44-year-old female presents to immediate care with complaints of headache, sore throat, postnasal drip, and persistent nausea x 1 week.  Denies any fevers or cough.  Does state her daughter was sick with similar symptoms minus the nausea last week.  Patient denies any history of migraines denies any photophobia.  Denies any abdominal pain with nausea.  No urinary complaints.    The history is provided by the patient.           Objective:   Past Medical History:    Anemia    History of pregnancy     x2    Hx of pregnancy     x2    Hypoglycemia    Raynaud phenomenon              Past Surgical History:   Procedure Laterality Date    Electrocardiogram, complete  1/10/14    scanned to media tab          12hr labor . 38 week 6lb 5oz Female [facility]      2013 . 1st degree perineal laceration.  Rebecca Pathak    Other surgical history      Krsita Diaz    Other surgical history      Urogyn repair                Social History     Socioeconomic History    Marital status:    Tobacco Use    Smoking status: Former     Current packs/day: 0.50     Average packs/day: 0.5 packs/day for 10.0 years (5.0 ttl pk-yrs)     Types: Cigarettes    Smokeless tobacco: Never   Vaping Use    Vaping status: Never Used   Substance and Sexual Activity    Alcohol use: Yes     Alcohol/week: 1.0 - 2.0 standard drink of alcohol     Types: 1 - 2 Glasses of wine per week     Comment: daily    Drug use: No   Other Topics Concern    Caffeine Concern Yes     Comment: 2 cups of coffee daily 1 cup of soda daily and tea once rarely              Review of Systems    Positive for stated Chief Complaint: Headache    Other systems are as noted in HPI.  Constitutional and vital signs reviewed.      All other systems reviewed and negative  except as noted above.    Physical Exam     ED Triage Vitals [08/12/24 0927]   /62   Pulse 50   Resp 16   Temp 97.9 °F (36.6 °C)   Temp src Temporal   SpO2 100 %   O2 Device None (Room air)       Current Vitals:   Vital Signs  BP: 110/62  Pulse: 50  Resp: 16  Temp: 97.9 °F (36.6 °C)  Temp src: Temporal    Oxygen Therapy  SpO2: 100 %  O2 Device: None (Room air)            Physical Exam  Constitutional:       Appearance: Normal appearance.   HENT:      Head: Normocephalic and atraumatic.      Right Ear: Tympanic membrane and ear canal normal.      Left Ear: Tympanic membrane and ear canal normal.      Nose: Nose normal.      Mouth/Throat:      Mouth: Mucous membranes are moist.      Pharynx: Oropharynx is clear.   Eyes:      Extraocular Movements: Extraocular movements intact.      Conjunctiva/sclera: Conjunctivae normal.      Pupils: Pupils are equal, round, and reactive to light.   Cardiovascular:      Rate and Rhythm: Normal rate and regular rhythm.      Pulses: Normal pulses.      Heart sounds: Normal heart sounds.   Pulmonary:      Effort: Pulmonary effort is normal.      Breath sounds: Normal breath sounds.   Musculoskeletal:         General: Normal range of motion.      Cervical back: Normal range of motion and neck supple.   Skin:     General: Skin is warm and dry.   Neurological:      Mental Status: She is alert and oriented to person, place, and time.   Psychiatric:         Mood and Affect: Mood normal.         Behavior: Behavior normal.               ED Course     Labs Reviewed   POCT RAPID STREP - Normal   POCT PREGNANCY URINE - Normal   RAPID SARS-COV-2 BY PCR - Normal                      MDM                                         Medical Decision Making  Patient presented with symptoms consistent with viral syndrome however she does have persistent nausea.  Urine pregnancy test done which was negative.  Rapid strep and COVID test today are also negative.  She is well-appearing and vitals are  stable.  She has had no fevers and has no sinus tenderness on exam.  Low concern for sinus infection.  This is likely viral.  Advised patient to take over-the-counter cold medication we will give her some Zofran for the nausea.  Her nausea symptoms do not seem to be related to a reflux/GERD issue, but did advise patient to discuss the symptoms with her primary care provider.  Advised to follow-up with her primary care provider and return to immediate care with any new or worsening symptoms.  Differential diagnosis: URI versus sinusitis versus COVID versus migraine versus GERD    Amount and/or Complexity of Data Reviewed  Labs:  Decision-making details documented in ED Course.    Risk  OTC drugs.  Prescription drug management.        Disposition and Plan     Clinical Impression:  1. Viral syndrome    2. Encounter for laboratory testing for COVID-19 virus    3. Nausea         Disposition:  Discharge  8/12/2024 10:11 am    Follow-up:  Jaron Charles, DO  [address] [place] [id number]  [phone]    Schedule an appointment as soon as possible for a visit             Medications Prescribed:  Current Discharge Medication List        START taking these medications    Details   ondansetron 4 MG Oral Tablet Dispersible Take 1 tablet (4 mg total) by mouth every 4 (four) hours as needed for Nausea.  Qty: 10 tablet, Refills: 0

## 2024-08-12 NOTE — DISCHARGE INSTRUCTIONS
Your COVID and strep test today were negative.  I have sent in some Zofran to help with the nausea.  I would try some over-the-counter allergy medicine to see if that helps with your symptoms.  Please follow-up with Dr. Charles this week.  Return to immediate care with any new or worsening symptoms.

## 2024-08-13 ENCOUNTER — TELEMEDICINE (OUTPATIENT)
Dept: FAMILY MEDICINE CLINIC | Facility: CLINIC | Age: 44
End: 2024-08-13
Payer: COMMERCIAL

## 2024-08-13 DIAGNOSIS — J01.90 ACUTE NON-RECURRENT SINUSITIS, UNSPECIFIED LOCATION: Primary | ICD-10-CM

## 2024-08-13 PROCEDURE — 99213 OFFICE O/P EST LOW 20 MIN: CPT | Performed by: FAMILY MEDICINE

## 2024-08-13 RX ORDER — AMOXICILLIN AND CLAVULANATE POTASSIUM 875; 125 MG/1; MG/1
1 TABLET, FILM COATED ORAL 2 TIMES DAILY
Qty: 20 TABLET | Refills: 0 | Status: SHIPPED | OUTPATIENT
Start: 2024-08-13 | End: 2024-08-23

## 2024-08-13 NOTE — PROGRESS NOTES
HPI:    Ashley Pardo is a 44 year old female presents for video visit with 9-day history of headaches, nasal congestion, pressure in her head, nausea.  She was seen yesterday in urgent care-negative strep/COVID test.  Was prescribed Zofran which helps with nausea.  She denies fevers, chills, vomiting, loose stools, cough, shortness of breath, wheezing.  History of recurrent sinusitis.      HISTORY:  Past Medical History:    Anemia    History of pregnancy     x2    Hx of pregnancy     x2    Hypoglycemia    Raynaud phenomenon      Past Surgical History:   Procedure Laterality Date    Electrocardiogram, complete  1/10/14    scanned to media tab          12hr labor . 38 week 6lb 5oz Female [facility]      2013 . 1st degree perineal laceration.  Rebecca Pathak    Other surgical history      Krista Diaz    Other surgical history      Urogyn repair      Family History   Problem Relation Age of Onset    Depression Mother     Infectious Disease Mother         Lyme disease    Cancer Maternal Grandmother 40        PLS CONFIRM - NG SHOWS MGFA, NOT MGMA    Dementia Paternal Grandmother 89        Alzheimer's disease    Heart Disease Paternal Grandfather 70        CAD    Cancer Paternal Grandfather     Cancer Maternal Grandfather 40      Social History:   Social History     Socioeconomic History    Marital status:    Tobacco Use    Smoking status: Former     Current packs/day: 0.50     Average packs/day: 0.5 packs/day for 10.0 years (5.0 ttl pk-yrs)     Types: Cigarettes    Smokeless tobacco: Never   Vaping Use    Vaping status: Never Used   Substance and Sexual Activity    Alcohol use: Yes     Alcohol/week: 1.0 - 2.0 standard drink of alcohol     Types: 1 - 2 Glasses of wine per week     Comment: daily    Drug use: No   Other Topics Concern    Caffeine Concern Yes     Comment: 2 cups of coffee daily 1 cup of soda daily and tea once rarely        Medications (Active  prior to today's visit):  Current Outpatient Medications   Medication Sig Dispense Refill    amoxicillin clavulanate 875-125 MG Oral Tab Take 1 tablet by mouth 2 (two) times daily for 10 days. 20 tablet 0    ondansetron 4 MG Oral Tablet Dispersible Take 1 tablet (4 mg total) by mouth every 4 (four) hours as needed for Nausea. 10 tablet 0    cyclobenzaprine 5 MG Oral Tab Take 1 tablet (5 mg total) by mouth 3 (three) times daily as needed for Muscle spasms. 30 tablet 0    Probiotic Product (PROBIOTIC OR) Take by mouth.      sertraline 100 MG Oral Tab Take 1 tablet (100 mg total) by mouth every morning.         Allergies:  Allergies   Allergen Reactions    Dust Mite Extract      CATS         Depression Screening (PHQ-2/PHQ-9): Over the LAST 2 WEEKS                         ROS:   Review of Systems   All other systems reviewed and are negative.      PHYSICAL EXAM:   There were no vitals filed for this visit.  Physical Exam  Constitutional:       General: She is not in acute distress.  Cardiovascular:      Rate and Rhythm: Normal rate.   Pulmonary:      Effort: Pulmonary effort is normal. No respiratory distress.   Neurological:      Mental Status: She is alert.   Psychiatric:         Mood and Affect: Mood normal.         ASSESSMENT/PLAN:   (J01.90) Acute non-recurrent sinusitis, unspecified location  (primary encounter diagnosis)  Plan:   -Augmentin to pharmacy, take twice daily x 10 days.  I also suggested she take an over-the-counter decongestant like DayQuil or Sudafed for symptomatic relief.  Adequate hydration/rest advised.  Follow-up if symptoms do not improve in 7 to 10 days.    I conducted a telehealth visit with the above named patient, which was completed using two-way, real-time interactive audio and video communication. This has been done in good reggie to provide continuity of care in the best interest of the provider-patient relationship, due to the COVID -19 public health crisis/national emergency where  restrictions of face-to-face office visits are ongoing. Every conscious effort was taken to allow for sufficient and adequate time to complete the visit.  The patient was made aware of the limitations of the telehealth visit, including treatment limitations as no physical exam could be performed.  The patient was advised to call 911 or to go to the ER in case there was an emergency.  The patient was also advised of the potential privacy & security concerns related to the telehealth platform.   The patient was made aware of where to find [facility]'s notice of privacy practices, telehealth consent form and other related consent forms and documents.  which are located on the [facility] website. The patient verbally agreed to telehealth consent form, related consents and the risks discussed.    Lastly, the patient confirmed that they were in [place].   Included in this visit, time may have been spent reviewing labs, medications, radiology tests and decision making. Appropriate medical decision-making and tests are ordered as detailed in the plan of care above.  Coding/billing information is submitted for this visit based on complexity of care and/or time spent for the visit.             Responsible party/patient verbalized understanding of information discussed. No barriers to learning observed.            Orders This Visit:  No orders of the defined types were placed in this encounter.      Meds This Visit:  Requested Prescriptions     Signed Prescriptions Disp Refills    amoxicillin clavulanate 875-125 MG Oral Tab 20 tablet 0     Sig: Take 1 tablet by mouth 2 (two) times daily for 10 days.       Imaging & Referrals:  None     Chaperone offered at visit today.     The 21st Century cures Act makes medical notes like these available to patients in the interest of transparency.  However, be advised that this is a medical document.  It is intended as peer to peer communication.  It is written in medical language and may contain  abbreviations or verbiage that are unfamiliar.  It may appear blunt or direct.  Medical documents are intended to carry relevant information, facts as evident, and the clinical opinion of the practitioner.      This note was created by Dragon voice recognition. Errors in content may be related to improper recognition by the system; efforts to review and correct have been done but errors may still exist. Please contact me with any questions.       8/13/2024  Terry Beck MD

## 2024-09-19 ENCOUNTER — NURSE ONLY (OUTPATIENT)
Facility: CLINIC | Age: 44
End: 2024-09-19

## 2024-09-19 DIAGNOSIS — Z12.11 COLON CANCER SCREENING: Primary | ICD-10-CM

## 2024-09-19 NOTE — PROGRESS NOTES
Scheduled for:  Colonoscopy [id number]  Provider Name:  Dr. Felipe  Date:  Tues, 01/21/2025  Location:  [facility]  Sedation:  IV   Time:  10;45am (pt is aware Endo will call with arrival time     Prep:  Golytely   Meds/Allergies Reconciled?:  Yes    Diagnosis with codes:  Colon Screening Z12.11 /   Was patient informed to call insurance with codes (Y/N):  Yes     Referral sent?:  Referral was sent at the time of electronic surgical scheduling.    EM or [facility] notified?:  I sent an electronic request to Endo Scheduling and received a confirmation today.       Medication Orders:  None  Misc Orders:  None     Further instructions given by staff:  I discussed the prep instructions with the patient which she verbally understood and is aware that I will send the instructions today.via [employer]

## 2024-09-19 NOTE — PROGRESS NOTES
Called patient for scheduled TCS  Medications, pharmacy, and allergies reviewed.     Age 45-76 y/o:   › MD preference:   › Insurance:  [employer] [place]PO  › Last pcp Visit: 8/13/2024  › Last CBC: 10/21/2022  › H/W/BMI: 108lbs / 5'1\" / 20.42    Special comments/notes:    Telephone Colon Screening Questionnaire Yes No   Are you currently experiencing any new/abnormal GI symptoms? [] [x]   If yes, explain:     Rectal bleeding? [] [x]   Black stool? [] [x]   Dysphagia or food \"feeling stuck\" when eating? [] [x]   Intractable vomiting? [] [x]   Unexplained weight loss? [] [x]   First colonoscopy? [x] []   Family history of colon cancer? [] [x]   Any issues with anesthesia? [] [x]   If yes, explain:      Stroke, heart attack or stent placement in the last 12 months:  [] [x]   History of  respiratory issues/oxygen/MAURICE/COPD: [] [x]   If yes, CPAP/BiPAP:     History of devices (pacemaker/defibrillator) [] [x]   History of cardiac/CVA/(MI/stroke): [] [x]     Medications Yes  No   Anticoagulants (except Aspirin):  [] [x]   Diabetic Meds  PO: Hold day before and day of procedure  Insulin:  [] [x]   Weight loss meds (phentermine/vyvanse/saxsenda/etc): [] [x]   Iron/herbal/multivitamin supplement: [x] []   Usage of marijuana, CBD &/or vape products: [] [x]

## 2024-09-19 NOTE — PROGRESS NOTES
GI Staff:  TCS Colon Screening Orders    Please schedule: Colonoscopy [id number] with MAC OR IV (if appropriate)    Please send split dose Golytely bowel prep     Diagnosis: Colon Screening Z12.11 /     >>>Please inform patient if new medications are started after scheduling procedure they need to call clinic to notify us.

## 2024-09-19 NOTE — PROGRESS NOTES
[facility]        Please send prep     [id number] - [place], [place] - [address], [phone], [phone] [address] [address]   Phone: [phone] Fax: [phone]

## 2024-10-30 ENCOUNTER — HOSPITAL ENCOUNTER (OUTPATIENT)
Dept: MAMMOGRAPHY | Age: 44
Discharge: HOME | End: 2024-10-30
Attending: FAMILY MEDICINE
Payer: COMMERCIAL

## 2024-10-30 DIAGNOSIS — Z12.31 ENCOUNTER FOR SCREENING MAMMOGRAM FOR BREAST CANCER: ICD-10-CM

## 2024-10-30 PROCEDURE — 77063 BREAST TOMOSYNTHESIS BI: CPT | Performed by: FAMILY MEDICINE

## 2024-10-30 PROCEDURE — 77067 SCR MAMMO BI INCL CAD: CPT | Performed by: FAMILY MEDICINE

## 2024-12-27 ENCOUNTER — TELEPHONE (OUTPATIENT)
Dept: OBGYN CLINIC | Facility: CLINIC | Age: 44
End: 2024-12-27

## 2024-12-27 ENCOUNTER — HOSPITAL ENCOUNTER (OUTPATIENT)
Age: 44
Discharge: HOME | End: 2024-12-27
Payer: COMMERCIAL

## 2024-12-27 VITALS
HEART RATE: 55 BPM | RESPIRATION RATE: 20 BRPM | SYSTOLIC BLOOD PRESSURE: 104 MMHG | TEMPERATURE: 98 F | OXYGEN SATURATION: 100 % | DIASTOLIC BLOOD PRESSURE: 59 MMHG

## 2024-12-27 DIAGNOSIS — N76.0 ACUTE VAGINITIS: ICD-10-CM

## 2024-12-27 DIAGNOSIS — Z71.1 FEARED CONDITION NOT DEMONSTRATED: ICD-10-CM

## 2024-12-27 DIAGNOSIS — B37.9 YEAST INFECTION: Primary | ICD-10-CM

## 2024-12-27 PROCEDURE — 87491 CHLMYD TRACH DNA AMP PROBE: CPT | Performed by: NURSE PRACTITIONER

## 2024-12-27 PROCEDURE — 87591 N.GONORRHOEAE DNA AMP PROB: CPT | Performed by: NURSE PRACTITIONER

## 2024-12-27 PROCEDURE — 81514 NFCT DS BV&VAGINITIS DNA ALG: CPT | Performed by: NURSE PRACTITIONER

## 2024-12-27 NOTE — DISCHARGE INSTRUCTIONS
You are positive for yeast.  2 doses of fluconazole sent to pharmacy.  Take first dose today.  Wait 72 hours, if no symptom improvement or resolution, take second dose.

## 2024-12-27 NOTE — ED INITIAL ASSESSMENT (HPI)
Pt states thinks she has a possible tampon stuck in her vagina for about 3-4 days. Pt states recently started hormone treatment.     Pt states also having right outer ear pain for the last 3-4 days.

## 2024-12-27 NOTE — TELEPHONE ENCOUNTER
Pt verified name and .    Pt states that they think that they have lost a tampon in the vagina. Pt is unsure, but states that they are experiencing discomfort and they \"feel off\". Advised that pt be seen for further evaluation. No appointments available in office until end of next week. Advised that pt follow up in Urgent Care or PCP office for sooner evaluation of symptoms. Pt verbalized understanding and agreed.

## 2024-12-27 NOTE — TELEPHONE ENCOUNTER
Patient lost tampon up her she thinks . Patient had this happen before was told to call immediatly

## 2024-12-28 LAB
BV BACTERIA DNA VAG QL NAA+PROBE: NEGATIVE
C GLABRATA DNA VAG QL NAA+PROBE: NEGATIVE
C KRUSEI DNA VAG QL NAA+PROBE: NEGATIVE
CANDIDA DNA VAG QL NAA+PROBE: POSITIVE
T VAGINALIS DNA VAG QL NAA+PROBE: NEGATIVE

## 2024-12-28 RX ORDER — FLUCONAZOLE 150 MG/1
150 TABLET ORAL DAILY
Qty: 2 TABLET | Refills: 0 | Status: SHIPPED | OUTPATIENT
Start: 2024-12-28 | End: 2024-12-30

## 2024-12-28 NOTE — ED PROVIDER NOTES
Patient Seen in: [facility]      History     Chief Complaint   Patient presents with    Ear Problem     Entered by patient    [place]     Stated Complaint: Ear Problem    Subjective:   HPI  Patient is a 44-year-old female who presents to the immediate care center with a concern for abnormal vaginal discharge and vaginal discomfort.  She is concerned for potential retained tampon (that may have been placed 4 days ago).  Has been noticing malodorous discharge for the last couple of days.  She describes recently starting hormone replacement therapy prescribed by her gynecologist.            Objective:     Past Medical History:    Anemia    History of pregnancy     x2    Hx of pregnancy     x2    Hypoglycemia    Raynaud phenomenon              Past Surgical History:   Procedure Laterality Date    Electrocardiogram, complete  1/10/14    scanned to media tab          12hr labor . 38 week 6lb 5oz Female [facility]      2013 . 1st degree perineal laceration.  Rebecca Pathak    Other surgical history      Krista Diaz    Other surgical history      Urogyn repair                Social History     Socioeconomic History    Marital status:    Tobacco Use    Smoking status: Former     Current packs/day: 0.50     Average packs/day: 0.5 packs/day for 10.0 years (5.0 ttl pk-yrs)     Types: Cigarettes    Smokeless tobacco: Never   Vaping Use    Vaping status: Never Used   Substance and Sexual Activity    Alcohol use: Yes     Alcohol/week: 1.0 - 2.0 standard drink of alcohol     Types: 1 - 2 Glasses of wine per week     Comment: daily    Drug use: No   Other Topics Concern    Caffeine Concern Yes     Comment: 2 cups of coffee daily 1 cup of soda daily and tea once rarely              Review of Systems   Constitutional:  Negative for appetite change, chills and fever.   Gastrointestinal:  Negative for abdominal pain.   Genitourinary:  Positive for vaginal  bleeding (Spotting) and vaginal discharge. Negative for dysuria, frequency and hematuria.   Musculoskeletal:  Negative for arthralgias and myalgias.   Skin:  Negative for rash.   Neurological:  Negative for weakness and numbness.       Positive for stated complaint: Ear Problem  Other systems are as noted in HPI.  Constitutional and vital signs reviewed.      All other systems reviewed and negative except as noted above.    Physical Exam     ED Triage Vitals [12/27/24 1235]   /59   Pulse 55   Resp 20   Temp 98 °F (36.7 °C)   Temp src Oral   SpO2 100 %   O2 Device None (Room air)       Current Vitals:   Vital Signs  BP: 104/59  Pulse: 55  Resp: 20  Temp: 98 °F (36.7 °C)  Temp src: Oral    Oxygen Therapy  SpO2: 100 %  O2 Device: None (Room air)        Physical Exam  Vitals and nursing note reviewed. Exam conducted with a chaperone present (SOL Peters).   Constitutional:       General: She is not in acute distress.     Appearance: She is not ill-appearing.   Pulmonary:      Effort: Pulmonary effort is normal. No respiratory distress.   Genitourinary:     Labia:         Right: No lesion or injury.         Left: No lesion or injury.       Vagina: No signs of injury and foreign body. Vaginal discharge (Thin, dark brown) present. No lesions.      Cervix: No erythema.   Neurological:      Mental Status: She is alert.   Psychiatric:         Behavior: Behavior normal.             ED Course     Labs Reviewed   CHLAMYDIA/GONOCOCCUS, RM   VAGINITIS VAGINOSIS PCR PANEL                   MDM      Patient was reassured that there is no evidence of retained foreign body.    Pelvic cultures were obtained.  Patient was offered empiric treatment for bacterial vaginosis as she has had BV in the past and reports of malodorous discharge.  She states she would prefer to wait till cultures return to determine treatment plan.    Patient was advised that if these result negative, it is important for her to follow-up with her  gynecologist she is having the symptoms after starting hormone replacement therapy.  She states understanding and agrees with plan.              Medical Decision Making  Diff Dx considered today include, but are not exclusive of: Chlamydia, gonorrhea, trichomoniasis, pelvic inflammatory disease, bacterial vaginosis, vaginal candidiasis.       Problems Addressed:  Acute vaginitis: self-limited or minor problem    Amount and/or Complexity of Data Reviewed  Labs: ordered.        Disposition and Plan     Clinical Impression:  1. Acute vaginitis    2. Feared condition not demonstrated         Disposition:  Discharge  12/27/2024  1:56 pm    Follow-up:  Your gynecologist  Call for follow up next week if symptoms continue.              Medications Prescribed:  Discharge Medication List as of 12/27/2024  2:20 PM              Supplementary Documentation:

## 2024-12-30 LAB
C TRACH DNA SPEC QL NAA+PROBE: NEGATIVE
N GONORRHOEA DNA SPEC QL NAA+PROBE: NEGATIVE

## 2025-01-09 ENCOUNTER — TELEMEDICINE (OUTPATIENT)
Dept: TELEHEALTH | Age: 45
End: 2025-01-09
Payer: COMMERCIAL

## 2025-01-09 DIAGNOSIS — J01.90 VIRAL SINORHINITIS: Primary | ICD-10-CM

## 2025-01-09 DIAGNOSIS — R09.82 POST-NASAL DRIP: ICD-10-CM

## 2025-01-09 DIAGNOSIS — Z53.21 PATIENT LEFT WITHOUT BEING SEEN: Primary | ICD-10-CM

## 2025-01-09 PROCEDURE — 98005 SYNCH AUDIO-VIDEO EST LOW 20: CPT | Performed by: PHYSICIAN ASSISTANT

## 2025-01-09 RX ORDER — SERTRALINE HYDROCHLORIDE 25 MG/1
TABLET, FILM COATED ORAL
COMMUNITY
Start: 2025-01-03

## 2025-01-09 RX ORDER — HYDROXYZINE HYDROCHLORIDE 25 MG/1
TABLET, FILM COATED ORAL
COMMUNITY
Start: 2024-12-20

## 2025-01-09 NOTE — PROGRESS NOTES
Patient never completed check-in and did not connect to Video Visit.  Referred in-person for care.

## 2025-01-09 NOTE — PROGRESS NOTES
Virtual/Telephone Check-In    Ashley Pardo verbally consents to a Virtual/Telephone Check-In service on 01/09/25.  Patient has been referred to the [facility] website at [email or web address] to review the yearly Consent to Treat document.  Patient understands and accepts financial responsibility for any deductible, co-insurance and/or co-pays associated with this service.       Telehealth Verbal Consent   I conducted a telehealth visit with Ashley Pardo today, 01/09/25, which was completed using two-way, real-time interactive audio and video communication. This has been done in good reggie to provide continuity of care in the best interest of the provider-patient relationship, due to the COVID - public health crisis/national emergency where restrictions of face-to-face office visits are ongoing. Every conscious effort was taken to allow for sufficient and adequate time to complete the visit.  The patient was made aware of the limitations of the telehealth visit, including treatment limitations as no physical exam could be performed.  The patient was advised to call 911 or to go to the ER in case there was an emergency.  The patient was also advised of the potential privacy & security concerns related to the telehealth platform.   The patient was made aware of where to find [facility]'s notice of privacy practices, telehealth consent form and other related consent forms and documents.  which are located on the [facility] website. The patient verbally agreed to telehealth consent form, related consents and the risks discussed.    Lastly, the patient confirmed that they were in [place].   Included in this visit, time may have been spent reviewing labs, medications, radiology tests and decision making. Appropriate medical decision-making and tests are ordered as detailed in the plan of care above.  Coding/billing information is submitted for this visit based on complexity of care and/or time spent for the visit.    CHIEF COMPLAINT:      Chief Complaint   Patient presents with    Upper Respiratory Infection       HPI:   Ashley Pardo is a 44 year old female who presents for a video visit.  Patient reports sinus headache, runny nose, lots of post nasal drip leading to sore throat, feeling fatigued, bad taste in mouth.  Started to not feel well 3 days ago.   Sinus sx began about 1.5 days ago.  She has taken anything for symptoms.  Has not felt feverish/chills.  Appetite is lower.  Good fluid intake.  Reports sickness in house hold-- son dx with influenza on Monday.      Current Outpatient Medications   Medication Sig Dispense Refill    sertraline 25 MG Oral Tab       sertraline 50 MG Oral Tab       hydrOXYzine 25 MG Oral Tab TAKE 1 OR 2 TABLETS NIGHTLY ON AN AS NEEDED BASIS FOR INSOMNIA.      cyclobenzaprine 5 MG Oral Tab Take 1 tablet (5 mg total) by mouth 3 (three) times daily as needed for Muscle spasms. 30 tablet 0    Probiotic Product (PROBIOTIC OR) Take by mouth.        Past Medical History:    Anemia    History of pregnancy     x2    Hx of pregnancy     x2    Hypoglycemia    Raynaud phenomenon      Past Surgical History:   Procedure Laterality Date    Electrocardiogram, complete  1/10/14    scanned to media tab          12hr labor . 38 week 6lb 5oz Female [facility]      2013 . 1st degree perineal laceration.  Rebecca Pathak    Other surgical history      Krista Diaz    Other surgical history  2015    Urogyn repair         Social History     Socioeconomic History    Marital status:    Tobacco Use    Smoking status: Former     Current packs/day: 0.50     Average packs/day: 0.5 packs/day for 10.0 years (5.0 ttl pk-yrs)     Types: Cigarettes    Smokeless tobacco: Never   Vaping Use    Vaping status: Never Used   Substance and Sexual Activity    Alcohol use: Yes     Alcohol/week: 1.0 - 2.0 standard drink of alcohol     Types: 1 - 2 Glasses of wine per week     Comment: daily    Drug use:  No   Other Topics Concern    Caffeine Concern Yes     Comment: 2 cups of coffee daily 1 cup of soda daily and tea once rarely         REVIEW OF SYSTEMS:   GENERAL: see HPI  SKIN: no rashes or abnormal skin lesions  HEENT: See HPI  LUNGS: denies significant cough See HPI  CARDIOVASCULAR: denies chest pain or palpitations   GI: nausea/stomach upset from PND  NEURO: + sinus headaches    EXAM:   General: Alert, Well-appearing, and In no acute distress  Respiratory:   Speaking in full sentences comfortably  Normal work of breathing  No cough during visit  Head: Normocephalic  Nose: No obvious nasal discharge.  Skin: No obvious rashes or lesions from what observed.     No results found for this or any previous visit (from the past 24 hours).    ASSESSMENT AND PLAN:   Ashley Pardo is a 44 year old female who presents with symptoms that are consistent with    ASSESSMENT:   Encounter Diagnoses   Name Primary?    Viral sinorhinitis Yes    Post-nasal drip        PLAN: Viral nature of illness discussed. Patient to trial antihistamine, pain relievers (Tylenol or Advil), Sudafed or Mucinex, sinus rinses.  If no improvement in 7-10 days should follow up.  If develops new flu-like illness can seek care sooner due to flu exposure.      The patient indicates understanding of these issues and agrees to the plan.  The patient is asked to return if sx's persist or worsen.    Face to face time spent on Video Visit: 8:45  Total Time spent on visit including reviewing history, ordering labs/medication, patient examination and education: 20    Ashley Pardo understands video visit evaluation is not a substitute for face-to-face examination or emergency care. Patient advised to go to ER or call 911 for worsening symptoms or acute distress.

## 2025-01-12 NOTE — TELEPHONE ENCOUNTER
Pt stts she is having  Vaginal Discomfort and white discharge x 2 days  No abnormal smell that she can detect.   Pt stts she has a hx of yeast infections Orientation to room/Side rails x 2 or 4 up, assess large gaps, such that a patient could get extremity or other body part entrapped, use additional safety procedures

## 2025-01-17 ENCOUNTER — PATIENT MESSAGE (OUTPATIENT)
Dept: FAMILY MEDICINE CLINIC | Facility: CLINIC | Age: 45
End: 2025-01-17

## 2025-01-21 ENCOUNTER — HOSPITAL ENCOUNTER (OUTPATIENT)
Facility: HOSPITAL | Age: 45
Setting detail: HOSPITAL OUTPATIENT SURGERY
Discharge: HOME | End: 2025-01-21
Attending: INTERNAL MEDICINE | Admitting: INTERNAL MEDICINE
Payer: COMMERCIAL

## 2025-01-21 VITALS
BODY MASS INDEX: 20.39 KG/M2 | OXYGEN SATURATION: 99 % | RESPIRATION RATE: 17 BRPM | WEIGHT: 108 LBS | HEART RATE: 48 BPM | SYSTOLIC BLOOD PRESSURE: 97 MMHG | HEIGHT: 61 IN | DIASTOLIC BLOOD PRESSURE: 61 MMHG

## 2025-01-21 DIAGNOSIS — Z12.11 COLON CANCER SCREENING: ICD-10-CM

## 2025-01-21 PROCEDURE — 0DBK8ZX EXCISION OF ASCENDING COLON, VIA NATURAL OR ARTIFICIAL OPENING ENDOSCOPIC, DIAGNOSTIC: ICD-10-PCS | Performed by: INTERNAL MEDICINE

## 2025-01-21 PROCEDURE — G0500 MOD SEDAT ENDO SERVICE >5YRS: HCPCS | Performed by: INTERNAL MEDICINE

## 2025-01-21 PROCEDURE — 45385 COLONOSCOPY W/LESION REMOVAL: CPT | Performed by: INTERNAL MEDICINE

## 2025-01-21 PROCEDURE — 0DBN8ZX EXCISION OF SIGMOID COLON, VIA NATURAL OR ARTIFICIAL OPENING ENDOSCOPIC, DIAGNOSTIC: ICD-10-PCS | Performed by: INTERNAL MEDICINE

## 2025-01-21 RX ORDER — SODIUM CHLORIDE, SODIUM LACTATE, POTASSIUM CHLORIDE, CALCIUM CHLORIDE 600; 310; 30; 20 MG/100ML; MG/100ML; MG/100ML; MG/100ML
INJECTION, SOLUTION INTRAVENOUS CONTINUOUS
Status: DISCONTINUED | OUTPATIENT
Start: 2025-01-21 | End: 2025-01-21

## 2025-01-21 RX ORDER — MIDAZOLAM HYDROCHLORIDE 1 MG/ML
INJECTION INTRAMUSCULAR; INTRAVENOUS
Status: DISCONTINUED | OUTPATIENT
Start: 2025-01-21 | End: 2025-01-21

## 2025-01-21 NOTE — H&P
History & Physical Examination    Patient Name: Ashley Pardo  MRN: [id number]  [facility]: [id number]  YOB: 1980    Diagnosis: crc screening    Prescriptions Prior to Admission[1]  Current Facility-Administered Medications   Medication Dose Route Frequency    lactated ringers infusion   Intravenous Continuous       Allergies: Allergies[2]    Past Medical History:    Anemia    Anxiety state    History of pregnancy     x2    Hx of pregnancy     x2    Hypoglycemia    Raynaud phenomenon     Past Surgical History:   Procedure Laterality Date    Electrocardiogram, complete  01/10/2014    scanned to media tab      2010    12hr labor . 38 week 6lb 5oz [facility]      2013 . 1st degree perineal laceration.  Rebecca Pathak    Other surgical history  2015    Tummy Tuck    Other surgical history  2015    Urogyn repair    Other surgical history Left     cartilage tear wrist     Family History   Problem Relation Age of Onset    Depression Mother     Infectious Disease Mother         Lyme disease    Cancer Maternal Grandmother 40        PLS CONFIRM - NG SHOWS MGFA, NOT MGMA    Dementia Paternal Grandmother 89        Alzheimer's disease    Heart Disease Paternal Grandfather 70        CAD    Cancer Paternal Grandfather     Cancer Maternal Grandfather 40     Social History     Tobacco Use    Smoking status: Former     Current packs/day: 0.50     Average packs/day: 0.5 packs/day for 10.0 years (5.0 ttl pk-yrs)     Types: Cigarettes    Smokeless tobacco: Never   Substance Use Topics    Alcohol use: Yes     Alcohol/week: 1.0 - 2.0 standard drink of alcohol     Types: 1 - 2 Glasses of wine per week     Comment: daily         SYSTEM Check if Review is Normal Check if Physical Exam is Normal If not normal, please explain:   HEENT [X ] [ X]    NECK  [X ] [ X]    HEART [X ] [ X]    LUNGS [X ] [ X]    ABDOMEN [X ] [ X]    EXTREMITIES [X ] [ X]    OTHER         I have discussed the risks and benefits and alternatives of the procedure with the patient/family.  They understand and agree to proceed with plan of care.   I have reviewed the History and Physical done within the last 30 days.  Any changes noted above.    Zhane Felipe MD  [facility] - Gastroenterology  2025  9:48 AM               [1]   Medications Prior to Admission   Medication Sig Dispense Refill Last Dose/Taking    hydrOXYzine 25 MG Oral Tab TAKE 1 OR 2 TABLETS NIGHTLY ON AN AS NEEDED BASIS FOR INSOMNIA.   Taking    sertraline 25 MG Oral Tab    Taking    sertraline 50 MG Oral Tab    Taking    cyclobenzaprine 5 MG Oral Tab Take 1 tablet (5 mg total) by mouth 3 (three) times daily as needed for Muscle spasms. 30 tablet 0 Taking As Needed    Probiotic Product (PROBIOTIC OR) Take by mouth.   Taking    [] fluconazole 150 MG Oral Tab Take 1 tablet (150 mg total) by mouth daily for 2 doses. 2 tablet 0    [2]   Allergies  Allergen Reactions    Dust Mite Extract      CATS

## 2025-01-21 NOTE — OPERATIVE REPORT
COLONOSCOPY REPORT    Ashley Pardo     1980 Age 44 year old   PCP Jaron Charles DO Endoscopist Zhane Felipe MD     Date of procedure: 25    Procedure: Colonoscopy w/snare polypectomy    Pre-operative diagnosis: screening    Post-operative diagnosis: see impression    Medications: Medications: Versed 4 mg IV push.  Fentanyl 75 mcg IV push. [Per my order and under my supervision, the patient was sedated with intermittent intravenous doses of versed and fentanyl. The vital signs were monitored and recorded by an experienced RN. The procedure started after the patient was adequately sedated. Total time for moderate conscious sedation was 34 minutes].      Withdrawal time: 15 minutes    Procedure:  Informed consent was obtained from the patient after the risks of the procedure were discussed, including but not limited to bleeding, perforation, aspiration, infection, or possibility of a missed lesion. After discussions of the risks/benefits and alternatives to this procedure, as well as the planned sedation, the patient was placed in the left lateral decubitus position and begun on continuous blood pressure pulse oximetry and EKG monitoring and this was maintained throughout the procedure. Once an adequate level of sedation was obtained a digital rectal exam was completed. Then the lubricated tip of the [id number] diagnostic video pediatric colonoscope was inserted and advanced without difficulty to the cecum using the CO2 insufflation technique. The cecum was identified by localizing the trifold, the appendix and the ileocecal valve. Withdrawal was begun with thorough washing and careful examination of the colonic walls and folds. A routine second examination of the cecum/ascending colon was performed. Retroflexion was performed in the rectum. Photodocumentation was obtained. [place] bowel prep score of 9 (Right colon-3; Transverse colon-3, Left colon-3). I then carefully withdrew the instrument  from the patient who tolerated the procedure well.     Complications: none.    Findings:   -- BRIAN: normal rectal tone, no masses palpated.     -- 3 polyp(s) noted as follows:      A. #2 polyps in the ascending colon each measuring 4-6 mm; sessile morphology; cold snare polypectomy and retrieved.      B. 4 mm polyp in the sigmoid colon; sessile morphology; cold snare polypectomy and retrieved.    -- A retroflexed view of the rectum revealed no abnormalities.    -- The colonic mucosa throughout the colon showed normal vascular pattern, without evidence of angioectasias or inflammation.     Impression:   3 small polyps resected and retrieved    Recommend:  Pending pathology, repeat colonoscopy in 3-5 years.    >>>If tissue was obtained and you have not received your pathology results either by phone or letter within 2 weeks, please call our office at [phone].    Specimens: polyps    Blood loss: <1 ml      Zhane Felipe MD  [facility] Gastroenterology

## 2025-01-21 NOTE — DISCHARGE INSTRUCTIONS
Home Care Instructions for Colonoscopy and/or Gastroscopy with Sedation    Diet:  - Resume your regular diet as tolerated unless otherwise instructed.  - Start with light meals to minimize bloating.  - Do not drink alcohol today.    Medication:  - If you have questions about resuming your normal medications, please contact your Primary Care Physician.    Activities:  - Take it easy today. Do not return to work today.  - Do not drive today.  - Do not operate any machinery today (including kitchen equipment).    Colonoscopy:  - You may notice some rectal \"spotting\" (a little blood on the toilet tissue) for a day or two after the exam. This is normal.  - If you experience any rectal bleeding (not spotting), persistent tenderness or sharp severe abdominal pains, oral temperature over 100 degrees Fahrenheit, light-headedness or dizziness, or any other problems, contact your doctor.    **If unable to reach your doctor, please go to the [facility] Room**    - Your referring physician will receive a full report of your examination.  - If you do not hear from your doctor's office within two weeks of your biopsy, please call them for your results.    You may be able to see your laboratory results in [email or web address] between 4 and 7 business days.  In some cases, your physician may not have viewed the results before they are released to [email or web address].  If you have questions regarding your results contact the physician who ordered the test/exam by phone or via [email or web address] by choosing \"Ask a Medical Question.\"

## 2025-02-05 ENCOUNTER — TELEPHONE (OUTPATIENT)
Facility: CLINIC | Age: 45
End: 2025-02-05

## 2025-02-05 NOTE — TELEPHONE ENCOUNTER
Health Maintenance Updated.    3 year colonoscopy recall entered into patient outreach in [facility].  Next colonoscopy will be due 1/21/2028.    Patient viewed below result note in MyChart

## 2025-02-05 NOTE — TELEPHONE ENCOUNTER
----- Message from Zhaen Velasquez Ma sent at 2/5/2025  2:44 PM CST -----  Dear Ashley,    I reviewed the pathology report from the polyp(s) we completely removed during your recent colonoscopy. The polyps removed were adenomas, which is a benign growth. However, these are the types of polyps that if not removed could become a colon cancer. All of your polyps were completely removed.     The current health care guidelines recommend that patients with the size, number, and types of polyps you have should repeat their colonoscopy in 3 years to look for any new polyps that might have grown.    If you have further questions please call me at [phone] or message me through [employer].    Sincerely,   Zhane Felipe MD

## 2025-02-27 ENCOUNTER — E-VISIT (OUTPATIENT)
Dept: TELEHEALTH | Age: 45
End: 2025-02-27
Payer: COMMERCIAL

## 2025-02-27 DIAGNOSIS — J01.90 ACUTE RHINOSINUSITIS: Primary | ICD-10-CM

## 2025-02-27 PROCEDURE — 99421 OL DIG E/M SVC 5-10 MIN: CPT | Performed by: PHYSICIAN ASSISTANT

## 2025-02-27 RX ORDER — SERTRALINE HYDROCHLORIDE 100 MG/1
100 TABLET, FILM COATED ORAL EVERY MORNING
COMMUNITY
Start: 2025-01-17

## 2025-02-27 NOTE — PROGRESS NOTES
Ashley Pardo is a 44 year old female who initiated e-visit care today.    HPI:   See answers to questionnaire submission     Current Outpatient Medications   Medication Sig Dispense Refill    sertraline 100 MG Oral Tab Take 1 tablet (100 mg total) by mouth every morning.      hydrOXYzine 25 MG Oral Tab TAKE 1 OR 2 TABLETS NIGHTLY ON AN AS NEEDED BASIS FOR INSOMNIA.      Probiotic Product (PROBIOTIC OR) Take by mouth.        Past Medical History:    Anemia    Anxiety state    History of pregnancy     x2    Hx of pregnancy     x2    Hypoglycemia    Raynaud phenomenon      Past Surgical History:   Procedure Laterality Date    Colonoscopy N/A 2025    Procedure: COLONOSCOPY;  Surgeon: Zhane Felipe MD;  Location: [facility] ENDOSCOPY    Electrocardiogram, complete  01/10/2014    scanned to media tab      2010    12hr labor . 38 week 6lb 5oz Female [facility]      2013 . 1st degree perineal laceration.  Rebecca Pathak    Other surgical history  2015    Tummy Tuck    Other surgical history  2015    Urogyn repair    Other surgical history Left     cartilage tear wrist      Family History   Problem Relation Age of Onset    Depression Mother     Infectious Disease Mother         Lyme disease    Cancer Maternal Grandmother 40        PLS CONFIRM - NG SHOWS MGFA, NOT MGMA    Dementia Paternal Grandmother 89        Alzheimer's disease    Heart Disease Paternal Grandfather 70        CAD    Cancer Paternal Grandfather     Cancer Maternal Grandfather 40      Social History:  Social History     Socioeconomic History    Marital status:    Tobacco Use    Smoking status: Former     Current packs/day: 0.50     Average packs/day: 0.5 packs/day for 10.0 years (5.0 ttl pk-yrs)     Types: Cigarettes    Smokeless tobacco: Never   Vaping Use    Vaping status: Never Used   Substance and Sexual Activity    Alcohol use: Yes     Alcohol/week: 1.0 - 2.0 standard drink of  alcohol     Types: 1 - 2 Glasses of wine per week     Comment: daily    Drug use: No   Other Topics Concern    Caffeine Concern Yes     Comment: 2 cups of coffee daily 1 cup of soda daily and tea once rarely         ASSESSMENT AND PLAN:       Diagnoses and all orders for this visit:    Acute rhinosinusitis  -     amoxicillin clavulanate 875-125 MG Oral Tab; Take 1 tablet by mouth 2 (two) times daily for 7 days.         Duration of  the service:  6 minutes      See [facility] [facility] message exchange and Patient Instructions for Comfort Care and patient education.

## 2025-03-19 ENCOUNTER — OFFICE VISIT (OUTPATIENT)
Dept: OTOLARYNGOLOGY | Facility: CLINIC | Age: 45
End: 2025-03-19

## 2025-03-19 DIAGNOSIS — K21.9 LPRD (LARYNGOPHARYNGEAL REFLUX DISEASE): Primary | ICD-10-CM

## 2025-03-19 DIAGNOSIS — J30.9 ALLERGIC RHINITIS, UNSPECIFIED SEASONALITY, UNSPECIFIED TRIGGER: ICD-10-CM

## 2025-03-19 DIAGNOSIS — J34.2 NASAL SEPTAL DEVIATION: ICD-10-CM

## 2025-03-19 DIAGNOSIS — J34.3 HYPERTROPHY OF INFERIOR NASAL TURBINATE: ICD-10-CM

## 2025-03-19 PROCEDURE — 99203 OFFICE O/P NEW LOW 30 MIN: CPT | Performed by: OTOLARYNGOLOGY

## 2025-03-19 PROCEDURE — 31575 DIAGNOSTIC LARYNGOSCOPY: CPT | Performed by: OTOLARYNGOLOGY

## 2025-03-19 NOTE — PROGRESS NOTES
NEW PATIENT PROGRESS NOTE  OTOLOGY/OTOLARYNGOLOGY    REF MD:  No referring provider defined for this encounter.    PCP: Jaron Charles DO    CHIEF COMPLAINT:    Chief Complaint   Patient presents with    Throat Problem     Reports pressure in face and discomfort in back throat X 1 month        History of Present Illness  Ashley Pardo is a 44 year old female who presents with sinus and throat issues since February.    She has been experiencing sinus and throat issues since the beginning of February. Symptoms include fatigue, facial pressure, and a sensation of postnasal drip in the back of her throat. At night, she feels a harsh sensation in her nose, similar to breathing in freezing air, which is uncomfortable but not congested. She experiences a runny nose rather than congestion, sneezing, itchy, watery eyes, and a decreased sense of taste. Her throat feels irritated rather than sore, with occasional sour or acidic taste.    She has tried various treatments including nasal sprays, a neti pot, and allergy medications without significant relief. She used a nasal steroid spray, Flonase, intermittently for about two weeks but was unaware of the need for consistent use for effectiveness. She also tried Zyrtec and noted that the nasal spray might have helped slightly, but she was unsure about continuing its use.    Recently, she has been experiencing heartburn, which she previously only experienced during pregnancy. She started taking an oral contraceptive pill a few months ago for premenopausal symptoms but is considering discontinuing it due to ineffectiveness and the onset of night sweats.    She has a known allergy to dust mites but has not been allergy tested for other allergens. Her current medications include hydroxyzine and sertraline, which she has been taking for mood-related issues. She has a history of musculoskeletal issues and has experienced stomatitis or mucocytosis in the past, though she does  not recall significant issues with mouth irritation recently.        PAST MEDICAL HISTORY:    Past Medical History:    Anemia    Anxiety state    History of pregnancy     x2    Hx of pregnancy     x2    Hypoglycemia    Raynaud phenomenon       PAST SURGICAL HISTORY:    Past Surgical History:   Procedure Laterality Date    Colonoscopy N/A 2025    Procedure: COLONOSCOPY;  Surgeon: Zhane Felipe MD;  Location: [facility] ENDOSCOPY    Electrocardiogram, complete  01/10/2014    scanned to media tab      2010    12hr labor . 38 week 6lb 5oz Female [facility]      2013 . 1st degree perineal laceration.  Rebecca Pathak    Other surgical history  2015    Tummy Tuck    Other surgical history  2015    Urogyn repair    Other surgical history Left     cartilage tear wrist       Medications Ordered Prior to Encounter[1]    Allergies: Allergies[2]    SOCIAL HISTORY:    Social History     Tobacco Use    Smoking status: Former     Current packs/day: 0.50     Average packs/day: 0.5 packs/day for 10.0 years (5.0 ttl pk-yrs)     Types: Cigarettes    Smokeless tobacco: Never   Substance Use Topics    Alcohol use: Yes     Alcohol/week: 1.0 - 2.0 standard drink of alcohol     Types: 1 - 2 Glasses of wine per week     Comment: daily       Family History   Problem Relation Age of Onset    Depression Mother     Infectious Disease Mother         Lyme disease    Cancer Maternal Grandmother 40        PLS CONFIRM - NG SHOWS MGFA, NOT MGMA    Dementia Paternal Grandmother 89        Alzheimer's disease    Heart Disease Paternal Grandfather 70        CAD    Cancer Paternal Grandfather     Cancer Maternal Grandfather 40       REVIEW OF SYSTEMS:   PER HPI    EXAMINATION:  I washed my hands with an alcohol-based hand gel prior to examination  Constitutional:   --Vitals: Last menstrual period 2024, not currently breastfeeding.  General: no apparent distress,  well-developed  Respiratory: No stridor, stertor or increased work of breathing  ENT:  --Nose: no external nasal deformity, anterior rhinoscopy: Septal deviation to the left side, no inferior turbinate hypertrophy, Trace swelling of nasal lining with clear mucus.  --OC/OP: No trismus. No masses or lesions noted over the gingiva, buccal mucosa, tongue, FOM, hard/soft palate, tonsillar pillars, posterior pharyngeal wall. Tonsils 2-3+ with small crypts, no tonsil stones. FOM/BOT are soft.   --Neck: No palpable cervical lymphadenopathy, no thyromegaly, no masses or lesions over the bilateral submandibular or parotid glands  --Ear: (bilateral ears were examined under binocular microscopy)  Right ear microscopic exam:  Pinna: Normal, no lesions or masses.  Mastoid: Nontender on palpation.   External auditory canal: Clear, no masses or lesions.  Tympanic membrane: Intact, no lesions, normal landmarks.  Middle ear: Aerated.    Left ear microscopic exam:  Pinna: Normal, no lesions or masses.  Mastoid: Nontender on palpation.   External auditory canal: Clear, no masses or lesions.  Tympanic membrane: Intact, no lesions, normal landmarks.  Middle ear: Aerated.    Nasal endoscopy (date 03/19/25)  Verbal consent obtained, patient was correctly identified  Topical anesthesia with aerosolized lidocaine and neosynepherine spray in the bilateral nares  Findings:   Right: Clear mucous throughout. Edematous mucosa. Inferior turbinate is non-hypertrophic. Middle meatus is without polyps, purulence, masses. Middle turbinate is well formed and normal appearing. Sphenoethmoid recess is without polyps, purulence, masses.   Left:Clear mucous throughout. Edematous mucosa. Inferior turbinate is non-hypertrophic. Middle meatus is without polyps, purulence, masses. Middle turbinate is well formed and normal appearing. Sphenoethmoid recess is without polyps, purulence, masses.   Septum: leftward midseptal deviation, slight anterior rightward  septal deviation  Nasopharynx: No masses, bilateral eustachian tubes are patent. The bilateral fossae of Rosenmueller are clear.    Flexible fiberoptic laryngoscopy (date 03/19/25)  Informed consent obtained, patient was correctly identified  Topical anesthesia with aerosolized lidocaine and ephedrine spray in the bilateral nares  Findings: The flexible scope was advanced into the bilateral nares via the inferior meatus into the nasopharynx. No masses or lesions noted in the bilateral inferior meatus. The bilateral eustachian tubes are patent. No masses or lesions in the bilateral nasopharynx or fossae of Rosenmueller. No masses or lesions in the oropharynx, hypopharynx, supraglottis, glottis, subglottis. Normal TVF motion bilaterally in adduction and abduction. Notable interarytenoid edema/erythema  Complications none, procedure well tolerated      ASSESSMENT/PLAN:  Ashley Pardo is a 44 year old female with     ICD-10-CM   1. LPRD (laryngopharyngeal reflux disease)  K21.9   2. Allergic rhinitis, unspecified seasonality, unspecified trigger  J30.9   3. Nasal septal deviation  J34.2   4. Hypertrophy of inferior nasal turbinate  J34.3      Assessment & Plan  Allergic rhinitis, nasal septal deviation, inferior turbinate hypertrophy  Postnasal drip, likely exacerbated by seasonal allergies. Nasal steroid spray efficacy limited by inconsistent use.  - Use Flonase nasal spray once daily, two sprays in each nostril, for six weeks.  - Perform daily saline sinus rinse or neti pot.    Acid reflux  Heartburn and throat irritation possibly related to oral contraceptive use. Laryngoscopy showed inter arytenoid erythema and edema. Decision to avoid acid medication pending discontinuation of oral contraceptives.  - Provide lifestyle modification guidance for managing acid reflux.  - Reassess need for acid medication if symptoms persist after discontinuing oral contraceptive pills.    Tonsil stones  Slightly enlarged tonsils  with crypts, potential for stone formation, currently asymptomatic.    Follow-up  Re-evaluation needed to assess response to treatment for allergic rhinitis and acid reflux.  - Schedule follow-up appointment in six weeks to reassess symptoms and treatment efficacy.    Situation reviewed with the patient in detail.    Mauro Soto MD  Otology/Otolaryngology  [facility]   [address]  [place] [address]  Phone [phone]  Fax [phone]     I have personally performed the services described in this documentation. All medical record entries made by the scribe were at my direction and in my presence. I have reviewed the chart and agree that the medical record reflects my personal performance and is accurate and complete.           [1]   Current Outpatient Medications on File Prior to Visit   Medication Sig Dispense Refill    sertraline 100 MG Oral Tab Take 1 tablet (100 mg total) by mouth every morning.      hydrOXYzine 25 MG Oral Tab TAKE 1 OR 2 TABLETS NIGHTLY ON AN AS NEEDED BASIS FOR INSOMNIA.      Probiotic Product (PROBIOTIC OR) Take by mouth.       No current facility-administered medications on file prior to visit.   [2]   Allergies  Allergen Reactions    Dust Mite Extract      CATS

## 2025-03-19 NOTE — PROGRESS NOTES
The following individual(s) verbally consented to be recorded using ambient AI listening technology and understand that they can each withdraw their consent to this listening technology at any point by asking the clinician to turn off or pause the recording:  Patient consents YES to JORGE MELLO.   Patient name: Ashley Aparicioan

## 2025-03-19 NOTE — PATIENT INSTRUCTIONS
Laryngopharyngeal Reflux (LPR) \"Acid Reflux\"        Your stomach produces acid to help break down food so it is easier to digest.  It is prevented from backing up or refluxing into your esophagus (food pipe) and throat by a band of muscle at the top of the stomach known as the lower esophageal sphincter.  If stomach acid comes up into the esophagus, it is termed Gastro-Esophageal Reflux (ADAM).  There is another valve at the top of the esophagus, the upper esophageal sphincter.  If this band of muscle is not functioning well, you can have a backflow of acid up into the sensitive tissue at the back of the throat, larynx (voice box), and even the back of the nasal airway.  This is called laryngopharyngeal reflux (LPR).  LPR is different than gastro-esophageal reflux (ADAM). Typically, individuals with ADAM suffer from heartburn.  Although  some persons with LPR do suffer from heartburn, most persons with LPR do not.  LPR can also affect the lungs and may exacerbate asthma, emphysema, or bronchitis.  Some vocal symptoms result from direct irritation from acidic stomach secretions, while other symptoms may result from tightening of the muscles in the larynx and neck in response to the irritation.      The following are symptoms that may be consistent with acid reflux irritation:  - Frequent throat clearing                              - Feeling like you are choking  - Chronic cough  - Cough that wakes you from your sleep  - Hoarseness  - Trouble swallowing  - Sensation of having “a lump in the throat”  - Thick or too much mucous  - Sour or acidic taste in mouth  - Recurrent sore throat     Treatment for LPR involves keeping stomach contents where they belong and neutralizing stomach acid.  To help manage reflux, the following diet and lifestyle changes are recommended.     Obesity promotes reflux.  Lose weight, if you need to.  Avoid tight clothing around the midsection of the body and avoid bending after meals, both of  which squeeze the abdomen and increases the chances of reflux.  Do not exercise after meals or within two hours of bedtime.  Reduce stress. Stress can create increased acid secretion.  Limit aspirin and ibuprofen.  They may increase stomach irritation.  Stop smoking as nicotine increases reflux tendencies.  Elevate the head of your bed on blocks, 6 inches.  Extra pillows are not as effective.  Watch when you eat:  Eat dinner at least 3 hours before you lie down.  Do not snack after the evening meal.  Eating more frequent, smaller meals may help reduce reflux tendencies.  Watch what you eat:  Avoid fatty foods.  High fat foods can increase acid secretion, decrease lower esophageal  Function, or slow down the emptying of the stomach.  - Avoid spicy foods.  Some spices may irritate the esophageal lining.  - Avoid acidic foods.  Some spices may irritate the esophageal lining.  - Avoid chocolate, nuts, peppermint, alcohol, caffeine, and fizzy beverages.  These affect the lower esophageal sphincter and increase the likelihood of reflux.                                                               DIETARY   PLAN                                                                       FOOD GROUPS                  Group             Recommend Avoid/Limit Consumption   Milk or milk products Skim, 1%or 2% low -fat milk or fat-free yogurt Whole milk (4%), chocolate milk   Vegetables All other vegetables Fried or creamy style vegetables, tomatoes (sauces, pizza, ketchup)   Fruits Apples, melons, bananas,  pears Citrus fruits such as oranges, berries, grapefruit, pineapple, kiwi, peaches   Breads & Grains All those made with low-fat content Any prepared with whole milk or high-fat   Meat & Meat substitutes Low-fat meat, chicken, fish, turkey Spicy cold cuts, sausage, topete, fatty meat, chicken fat/skin   Fats & Oils None or small amounts Keep amount limited on a given day      Sweets & Desserts All  items made with no or low fat  (less than or equal to 3 g fat/serving) Chocolate, desserts made with high amounts of oils and/or fats      Beverages    Water, juices (except citrus) Alcohol, coffee (regular or decaffeinated), carbonated beverages, tea (green or black), pop   Spices All other spices that do not appear to have a negative effect Hot mustard, vinegar, hot peppers, bedolla, garlic, onion      Medication for Acid Reflux  Along with diet and lifestyle changes, your doctor may prescribe medication to help treat your acid reflux.The choice of medication will be based on you symptoms and test results.  As with any medication, if you experience side effects, call your doctor.     - Reducing Stomach Acid:  Your doctor may suggest antacids that you can buy over the counter         (i.e., Tums, Mylanta, Maalox), or you may be told to take a type of medication called H-2 blockers              (i.e., Tagamet, Pepcid, Zantac).  They block histamine 2, which signals the stomach to make acid.  - Blocking Stomach Acid: In more sever cases, your doctor may prescribe stronger medication (i.e.,  - Proton pump inhibitors (Protonix, Prevacid, Nexium).  Theses inhibit acid secretion.        You hold the key to controlling reflux.  Work with your physician, take any medications as directed, and follow the diet and lifestyle changes detailed in this handout.  In this way, you can help free yourself from the symptoms of laryngopharyngeal reflux (LPR).

## 2025-03-27 ENCOUNTER — E-VISIT (OUTPATIENT)
Dept: TELEHEALTH | Age: 45
End: 2025-03-27
Payer: COMMERCIAL

## 2025-03-27 DIAGNOSIS — N76.0 ACUTE VAGINITIS: Primary | ICD-10-CM

## 2025-03-27 DIAGNOSIS — N89.8 WHITE VAGINAL DISCHARGE: ICD-10-CM

## 2025-03-27 DIAGNOSIS — R09.89 SINUS SYMPTOM: Primary | ICD-10-CM

## 2025-03-27 PROCEDURE — 99421 OL DIG E/M SVC 5-10 MIN: CPT | Performed by: PHYSICIAN ASSISTANT

## 2025-03-27 RX ORDER — FLUCONAZOLE 150 MG/1
150 TABLET ORAL ONCE
Qty: 1 TABLET | Refills: 0 | Status: SHIPPED | OUTPATIENT
Start: 2025-03-27 | End: 2025-03-27

## 2025-03-27 NOTE — PROGRESS NOTES
Ashley Pardo is a 44 year old female who initiated e-visit care today.    HPI:   See answers to questionnaire submission     Current Outpatient Medications   Medication Sig Dispense Refill    sertraline 50 MG Oral Tab Take 1 tablet (50 mg total) by mouth every morning.      hydrOXYzine 25 MG Oral Tab TAKE 1 OR 2 TABLETS NIGHTLY ON AN AS NEEDED BASIS FOR INSOMNIA.      Probiotic Product (PROBIOTIC OR) Take by mouth.        Past Medical History:    Anemia    Anxiety state    History of pregnancy     x2    Hx of pregnancy     x2    Hypoglycemia    Raynaud phenomenon      Past Surgical History:   Procedure Laterality Date    Colonoscopy N/A 2025    Procedure: COLONOSCOPY;  Surgeon: Zhane Felipe MD;  Location: [facility] ENDOSCOPY    Electrocardiogram, complete  01/10/2014    scanned to media tab      2010    12hr labor . 38 week 6lb 5oz Female [facility]      2013 . 1st degree perineal laceration.  Rebecca Pathak    Other surgical history  2015    Tummy Tuck    Other surgical history  2015    Urogyn repair    Other surgical history Left     cartilage tear wrist      Family History   Problem Relation Age of Onset    Depression Mother     Infectious Disease Mother         Lyme disease    Cancer Maternal Grandmother 40        PLS CONFIRM - NG SHOWS MGFA, NOT MGMA    Dementia Paternal Grandmother 89        Alzheimer's disease    Heart Disease Paternal Grandfather 70        CAD    Cancer Paternal Grandfather     Cancer Maternal Grandfather 40      Social History:  Social History     Socioeconomic History    Marital status:    Tobacco Use    Smoking status: Former     Current packs/day: 0.50     Average packs/day: 0.5 packs/day for 10.0 years (5.0 ttl pk-yrs)     Types: Cigarettes    Smokeless tobacco: Never   Vaping Use    Vaping status: Never Used   Substance and Sexual Activity    Alcohol use: Yes     Alcohol/week: 1.0 - 2.0 standard drink of  alcohol     Types: 1 - 2 Glasses of wine per week     Comment: daily    Drug use: No   Other Topics Concern    Caffeine Concern Yes     Comment: 2 cups of coffee daily 1 cup of soda daily and tea once rarely         ASSESSMENT AND PLAN:       Diagnoses and all orders for this visit:    Acute vaginitis    White vaginal discharge       Advised patient that empiric tx with Monistat is first line. Patient given fluconazole in past. Will prescribe 1 dose.  Close follow up if not improving    Requested Prescriptions     Signed Prescriptions Disp Refills    fluconazole (DIFLUCAN) 150 MG Oral Tab 1 tablet 0     Sig: Take 1 tablet (150 mg total) by mouth once for 1 dose.         Duration of  the service:  6 minutes      See [employer] message exchange and Patient Instructions for Comfort Care and patient education.

## 2025-03-28 NOTE — PROGRESS NOTES
Ashley Pardo is a 44 year old female.  HPI:   See answers to questions above.     Current Outpatient Medications   Medication Sig Dispense Refill    sertraline 50 MG Oral Tab Take 1 tablet (50 mg total) by mouth every morning.      hydrOXYzine 25 MG Oral Tab TAKE 1 OR 2 TABLETS NIGHTLY ON AN AS NEEDED BASIS FOR INSOMNIA.      Probiotic Product (PROBIOTIC OR) Take by mouth.        Past Medical History:    Anemia    Anxiety state    History of pregnancy     x2    Hx of pregnancy     x2    Hypoglycemia    Raynaud phenomenon      Past Surgical History:   Procedure Laterality Date    Colonoscopy N/A 2025    Procedure: COLONOSCOPY;  Surgeon: Zhane Felipe MD;  Location: [facility] ENDOSCOPY    Electrocardiogram, complete  01/10/2014    scanned to media tab      2010    12hr labor . 38 week 6lb 5oz Female [facility]      2013 . 1st degree perineal laceration.  Rebecca Pathak    Other surgical history  2015    Tummy Tuck    Other surgical history  2015    Urogyn repair    Other surgical history Left     cartilage tear wrist      Family History   Problem Relation Age of Onset    Depression Mother     Infectious Disease Mother         Lyme disease    Cancer Maternal Grandmother 40        PLS CONFIRM - NG SHOWS MGFA, NOT MGMA    Dementia Paternal Grandmother 89        Alzheimer's disease    Heart Disease Paternal Grandfather 70        CAD    Cancer Paternal Grandfather     Cancer Maternal Grandfather 40      Social History:  Social History     Socioeconomic History    Marital status:    Tobacco Use    Smoking status: Former     Current packs/day: 0.50     Average packs/day: 0.5 packs/day for 10.0 years (5.0 ttl pk-yrs)     Types: Cigarettes    Smokeless tobacco: Never   Vaping Use    Vaping status: Never Used   Substance and Sexual Activity    Alcohol use: Yes     Alcohol/week: 1.0 - 2.0 standard drink of alcohol     Types: 1 - 2 Glasses of wine per  week     Comment: daily    Drug use: No   Other Topics Concern    Caffeine Concern Yes     Comment: 2 cups of coffee daily 1 cup of soda daily and tea once rarely         ASSESSMENT AND PLAN:     Encounter Diagnosis   Name Primary?    Sinus symptom Yes       Pt was treated for sinus infection with 7 days of Augmentin 2/27-3/6. States symptoms improved but now have returned. Advised in person evaluation to determine if abx therapy is indicated.    Meds & Refills for this Visit:  Requested Prescriptions      No prescriptions requested or ordered in this encounter       Duration of  the service:  2 minutes

## 2025-04-03 ENCOUNTER — LAB ENCOUNTER (OUTPATIENT)
Dept: LAB | Age: 45
End: 2025-04-03
Attending: FAMILY MEDICINE
Payer: COMMERCIAL

## 2025-04-03 ENCOUNTER — OFFICE VISIT (OUTPATIENT)
Dept: FAMILY MEDICINE CLINIC | Facility: CLINIC | Age: 45
End: 2025-04-03

## 2025-04-03 VITALS
HEART RATE: 59 BPM | HEIGHT: 61 IN | RESPIRATION RATE: 18 BRPM | SYSTOLIC BLOOD PRESSURE: 106 MMHG | WEIGHT: 118 LBS | BODY MASS INDEX: 22.28 KG/M2 | OXYGEN SATURATION: 100 % | TEMPERATURE: 98 F | DIASTOLIC BLOOD PRESSURE: 63 MMHG

## 2025-04-03 DIAGNOSIS — Z00.00 ROUTINE PHYSICAL EXAMINATION: ICD-10-CM

## 2025-04-03 DIAGNOSIS — Z00.00 ROUTINE PHYSICAL EXAMINATION: Primary | ICD-10-CM

## 2025-04-03 DIAGNOSIS — Z12.4 SCREENING FOR CERVICAL CANCER: ICD-10-CM

## 2025-04-03 DIAGNOSIS — R59.1 LYMPHADENOPATHY: ICD-10-CM

## 2025-04-03 LAB
ALBUMIN SERPL-MCNC: 4.4 G/DL (ref 3.2–4.8)
ALBUMIN/GLOB SERPL: 1.7 {RATIO} (ref 1–2)
ALP LIVER SERPL-CCNC: 60 U/L
ALT SERPL-CCNC: 9 U/L
ANION GAP SERPL CALC-SCNC: 7 MMOL/L (ref 0–18)
AST SERPL-CCNC: 21 U/L (ref ?–34)
BASOPHILS # BLD AUTO: 0.04 X10(3) UL (ref 0–0.2)
BASOPHILS NFR BLD AUTO: 0.6 %
BILIRUB SERPL-MCNC: 0.5 MG/DL (ref 0.3–1.2)
BUN BLD-MCNC: 12 MG/DL (ref 9–23)
BUN/CREAT SERPL: 15.6 (ref 10–20)
CALCIUM BLD-MCNC: 9.6 MG/DL (ref 8.7–10.4)
CHLORIDE SERPL-SCNC: 100 MMOL/L (ref 98–112)
CHOLEST SERPL-MCNC: 197 MG/DL (ref ?–200)
CO2 SERPL-SCNC: 30 MMOL/L (ref 21–32)
CREAT BLD-MCNC: 0.77 MG/DL
DEPRECATED RDW RBC AUTO: 43.1 FL (ref 35.1–46.3)
EGFRCR SERPLBLD CKD-EPI 2021: 97 ML/MIN/1.73M2 (ref 60–?)
EOSINOPHIL # BLD AUTO: 0.14 X10(3) UL (ref 0–0.7)
EOSINOPHIL NFR BLD AUTO: 2.1 %
ERYTHROCYTE [DISTWIDTH] IN BLOOD BY AUTOMATED COUNT: 12 % (ref 11–15)
FASTING PATIENT LIPID ANSWER: NO
FASTING STATUS PATIENT QL REPORTED: NO
GLOBULIN PLAS-MCNC: 2.6 G/DL (ref 2–3.5)
GLUCOSE BLD-MCNC: 87 MG/DL (ref 70–99)
HCT VFR BLD AUTO: 38.8 %
HDLC SERPL-MCNC: 75 MG/DL (ref 40–59)
HGB BLD-MCNC: 13 G/DL
IMM GRANULOCYTES # BLD AUTO: 0.01 X10(3) UL (ref 0–1)
IMM GRANULOCYTES NFR BLD: 0.2 %
LDLC SERPL CALC-MCNC: 94 MG/DL (ref ?–100)
LYMPHOCYTES # BLD AUTO: 1.98 X10(3) UL (ref 1–4)
LYMPHOCYTES NFR BLD AUTO: 29.9 %
MCH RBC QN AUTO: 32.6 PG (ref 26–34)
MCHC RBC AUTO-ENTMCNC: 33.5 G/DL (ref 31–37)
MCV RBC AUTO: 97.2 FL
MONOCYTES # BLD AUTO: 0.61 X10(3) UL (ref 0.1–1)
MONOCYTES NFR BLD AUTO: 9.2 %
NEUTROPHILS # BLD AUTO: 3.85 X10 (3) UL (ref 1.5–7.7)
NEUTROPHILS # BLD AUTO: 3.85 X10(3) UL (ref 1.5–7.7)
NEUTROPHILS NFR BLD AUTO: 58 %
NONHDLC SERPL-MCNC: 122 MG/DL (ref ?–130)
OSMOLALITY SERPL CALC.SUM OF ELEC: 283 MOSM/KG (ref 275–295)
PLATELET # BLD AUTO: 388 10(3)UL (ref 150–450)
POTASSIUM SERPL-SCNC: 3.7 MMOL/L (ref 3.5–5.1)
PROT SERPL-MCNC: 7 G/DL (ref 5.7–8.2)
RBC # BLD AUTO: 3.99 X10(6)UL
SODIUM SERPL-SCNC: 137 MMOL/L (ref 136–145)
TRIGL SERPL-MCNC: 164 MG/DL (ref 30–149)
TSI SER-ACNC: 0.94 UIU/ML (ref 0.55–4.78)
VLDLC SERPL CALC-MCNC: 27 MG/DL (ref 0–30)
WBC # BLD AUTO: 6.6 X10(3) UL (ref 4–11)

## 2025-04-03 PROCEDURE — 3008F BODY MASS INDEX DOCD: CPT | Performed by: FAMILY MEDICINE

## 2025-04-03 PROCEDURE — 84443 ASSAY THYROID STIM HORMONE: CPT

## 2025-04-03 PROCEDURE — 3078F DIAST BP <80 MM HG: CPT | Performed by: FAMILY MEDICINE

## 2025-04-03 PROCEDURE — 80061 LIPID PANEL: CPT

## 2025-04-03 PROCEDURE — 3074F SYST BP LT 130 MM HG: CPT | Performed by: FAMILY MEDICINE

## 2025-04-03 PROCEDURE — 85025 COMPLETE CBC W/AUTO DIFF WBC: CPT

## 2025-04-03 PROCEDURE — 99396 PREV VISIT EST AGE 40-64: CPT | Performed by: FAMILY MEDICINE

## 2025-04-03 PROCEDURE — 36415 COLL VENOUS BLD VENIPUNCTURE: CPT

## 2025-04-03 PROCEDURE — 80053 COMPREHEN METABOLIC PANEL: CPT

## 2025-04-03 NOTE — PROGRESS NOTES
Ashley Pardo is a 44 year old female.  Chief Complaint   Patient presents with    Routine Physical     Pt is here for routine physical & pap smear       HPI:   Patient is a 44-year-old female presents today for routine physical and Pap    Current Outpatient Medications   Medication Sig Dispense Refill    sertraline 50 MG Oral Tab Take 1 tablet (50 mg total) by mouth every morning.      hydrOXYzine 25 MG Oral Tab TAKE 1 OR 2 TABLETS NIGHTLY ON AN AS NEEDED BASIS FOR INSOMNIA.      Probiotic Product (PROBIOTIC OR) Take by mouth.        Past Medical History:    Anemia    Anxiety state    History of pregnancy     x2    Hx of pregnancy     x2    Hypoglycemia    Raynaud phenomenon      Past Surgical History:   Procedure Laterality Date    Colonoscopy N/A 2025    Procedure: COLONOSCOPY;  Surgeon: Zhane Felipe MD;  Location: [facility] ENDOSCOPY    Electrocardiogram, complete  01/10/2014    scanned to media tab      2010    12hr labor . 38 week 6lb 5oz Female [facility]      2013 . 1st degree perineal laceration.  Rebecca Pathak    Other surgical history  2015    Tummy Tuck    Other surgical history  2015    Urogyn repair    Other surgical history Left     cartilage tear wrist      Social History:  Social History     Socioeconomic History    Marital status:    Tobacco Use    Smoking status: Former     Current packs/day: 0.50     Average packs/day: 0.5 packs/day for 10.0 years (5.0 ttl pk-yrs)     Types: Cigarettes    Smokeless tobacco: Never   Vaping Use    Vaping status: Never Used   Substance and Sexual Activity    Alcohol use: Yes     Alcohol/week: 1.0 - 2.0 standard drink of alcohol     Types: 1 - 2 Glasses of wine per week     Comment: daily    Drug use: No   Other Topics Concern    Caffeine Concern Yes     Comment: 2 cups of coffee daily 1 cup of soda daily and tea once rarely        REVIEW OF SYSTEMS:   GENERAL HEALTH: No fevers, chills,  sweats, fatigue  VISION: No recent vision problems, blurry vision or double vision  HEENT: No decreased hearing ear pain nasal congestion or sore throat  SKIN: denies any unusual skin lesions or rashes  RESPIRATORY: denies shortness of breath, cough, wheezing  CARDIOVASCULAR: denies chest pain on exertion, palpitations, swelling in feet  GI: denies abdominal pain and denies heartburn, nausea or vomiting  : No Pain on urination, change in the color of urine, discharge, urinating frequently  MUS: No back pain, joint pain, muscle pain  NEURO: denies headaches , anxiety, depression    EXAM:   /63 (BP Location: Left arm, Patient Position: Sitting, Cuff Size: adult)   Pulse 59   Temp 98 °F (36.7 °C) (Temporal)   Resp 18   Ht 5' 1\" (1.549 m)   Wt 118 lb (53.5 kg)   LMP 07/29/2024 (Approximate)   SpO2 100%   BMI 22.30 kg/m²   GENERAL: well developed, well nourished,in no apparent distress  SKIN: no rashes,no suspicious lesions  HEENT: atraumatic, normocephalic,ears and throat are clear,   NECK: supple,no adenopathy,  LUNGS: clear to auscultation, no wheeze  CARDIO: RRR without murmur  GI: good BS's,no masses or tenderness  EXTREMITIES: no cyanosis, or edema    ASSESSMENT AND PLAN:   1. Screening for cervical cancer  Pap today if normal repeat in 5 years  - ThinPrep PAP with HPV Reflex Request [E]; Future    2. Routine physical examination  Discussed diet and exercise with patient labs today  - CBC W Differential W Platelet [E]; Future  - Comp Metabolic Panel (14) [E]; Future  - Lipid Panel [E]; Future       The patient indicates understanding of these issues and agrees to the plan.  No follow-ups on file.

## 2025-04-07 LAB — HPV E6+E7 MRNA CVX QL NAA+PROBE: NEGATIVE

## 2025-04-08 ENCOUNTER — HOSPITAL ENCOUNTER (OUTPATIENT)
Dept: GENERAL RADIOLOGY | Age: 45
Discharge: HOME | End: 2025-04-08
Attending: FAMILY MEDICINE
Payer: COMMERCIAL

## 2025-04-08 DIAGNOSIS — R59.1 LYMPHADENOPATHY: ICD-10-CM

## 2025-04-08 PROCEDURE — 71046 X-RAY EXAM CHEST 2 VIEWS: CPT | Performed by: FAMILY MEDICINE

## 2025-04-26 ENCOUNTER — E-VISIT (OUTPATIENT)
Dept: TELEHEALTH | Age: 45
End: 2025-04-26
Payer: COMMERCIAL

## 2025-04-26 DIAGNOSIS — R09.89 SINUS SYMPTOM: Primary | ICD-10-CM

## 2025-04-27 NOTE — PROGRESS NOTES
Ashley Pardo is a 44 year old female.  HPI:   See answers to questions above.     Current Medications[1]   Past Medical History[2]   Past Surgical History[3]   Family History[4]   Social History:  Short Social Hx on File[5]      ASSESSMENT AND PLAN:     Encounter Diagnosis   Name Primary?    Sinus symptom Yes     Was treated with Amox-clav via EV on 25. States sinus symptoms never fully resolved, and have worsened in the past 2 days. Needs in person evaluation to determine if additional abx therapy is indicated.      Meds & Refills for this Visit:  Requested Prescriptions      No prescriptions requested or ordered in this encounter       Duration of  the service:  3 minutes             [1]   Current Outpatient Medications   Medication Sig Dispense Refill    sertraline 50 MG Oral Tab Take 1 tablet (50 mg total) by mouth every morning.      hydrOXYzine 25 MG Oral Tab TAKE 1 OR 2 TABLETS NIGHTLY ON AN AS NEEDED BASIS FOR INSOMNIA.      Probiotic Product (PROBIOTIC OR) Take by mouth.     [2]   Past Medical History:   Anemia    Anxiety state    History of pregnancy     x2    Hx of pregnancy     x2    Hypoglycemia    Raynaud phenomenon   [3]   Past Surgical History:  Procedure Laterality Date    Colonoscopy N/A 2025    Procedure: COLONOSCOPY;  Surgeon: Zhane Felipe MD;  Location: [facility] ENDOSCOPY    Electrocardiogram, complete  01/10/2014    scanned to media tab      2010    12hr labor . 38 week 6lb 5oz Female [facility]      2013 . 1st degree perineal laceration.  Rebecca Pathak    Other surgical history  2015    Tummy Tuck    Other surgical history  2015    Urogyn repair    Other surgical history Left     cartilage tear wrist   [4]   Family History  Problem Relation Age of Onset    Depression Mother     Infectious Disease Mother         Lyme disease    Cancer Maternal Grandmother 40        PLS CONFIRM - NG SHOWS MGFA, NOT MGMA    Dementia  Paternal Grandmother 89        Alzheimer's disease    Heart Disease Paternal Grandfather 70        CAD    Cancer Paternal Grandfather     Cancer Maternal Grandfather 40   [5]   Social History  Socioeconomic History    Marital status:    Tobacco Use    Smoking status: Former     Current packs/day: 0.50     Average packs/day: 0.5 packs/day for 10.0 years (5.0 ttl pk-yrs)     Types: Cigarettes    Smokeless tobacco: Never   Vaping Use    Vaping status: Never Used   Substance and Sexual Activity    Alcohol use: Yes     Alcohol/week: 1.0 - 2.0 standard drink of alcohol     Types: 1 - 2 Glasses of wine per week     Comment: daily    Drug use: No   Other Topics Concern    Caffeine Concern Yes     Comment: 2 cups of coffee daily 1 cup of soda daily and tea once rarely

## 2025-04-30 ENCOUNTER — OFFICE VISIT (OUTPATIENT)
Dept: FAMILY MEDICINE CLINIC | Facility: CLINIC | Age: 45
End: 2025-04-30

## 2025-04-30 VITALS
RESPIRATION RATE: 19 BRPM | HEART RATE: 67 BPM | WEIGHT: 113 LBS | DIASTOLIC BLOOD PRESSURE: 67 MMHG | HEIGHT: 61 IN | OXYGEN SATURATION: 99 % | BODY MASS INDEX: 21.34 KG/M2 | SYSTOLIC BLOOD PRESSURE: 100 MMHG | TEMPERATURE: 97 F

## 2025-04-30 DIAGNOSIS — J01.90 ACUTE RHINOSINUSITIS: Primary | ICD-10-CM

## 2025-04-30 PROCEDURE — 3074F SYST BP LT 130 MM HG: CPT | Performed by: FAMILY MEDICINE

## 2025-04-30 PROCEDURE — 3008F BODY MASS INDEX DOCD: CPT | Performed by: FAMILY MEDICINE

## 2025-04-30 PROCEDURE — 99213 OFFICE O/P EST LOW 20 MIN: CPT | Performed by: FAMILY MEDICINE

## 2025-04-30 PROCEDURE — 3078F DIAST BP <80 MM HG: CPT | Performed by: FAMILY MEDICINE

## 2025-04-30 RX ORDER — FEXOFENADINE HCL AND PSEUDOEPHEDRINE HCI 60; 120 MG/1; MG/1
1 TABLET, EXTENDED RELEASE ORAL 2 TIMES DAILY
Qty: 30 TABLET | Refills: 0 | Status: SHIPPED | OUTPATIENT
Start: 2025-04-30

## 2025-04-30 NOTE — PATIENT INSTRUCTIONS
Medication reviewed and renewed where needed and appropriate.  Comply with medications.  Augmentin 875 mg to be taken twice a day for 10 days and the course to be completed.  Allegra-D 12-hour also prescribed in order to reduce congestion and sinus discomfort and also eliminate postnasal drip.

## 2025-04-30 NOTE — PROGRESS NOTES
Subjective:     Patient ID: Ashley Pardo is a 44 year old female.    This patient is a well-established 44-year-old  female who unfortunately reports not feeling well since January.  She has had recurrent upper respiratory symptoms. Most recently, she has had a combination of nausea and vomiting along with a dry cough.  Patient reports no fever and has not felt as though she had a fever. Stool is normally formed.  There is sinus congestion and facial and head discomfort.  Her children also currently ill, but they became ill after the patient's above-stated symptoms.    The patient has spent a great amount of hours in bed over the weekend feeling fatigued and malaised.        History/Other:   Review of Systems  Current Medications[1]  Allergies:Allergies[2]    Past Medical History[3]   Past Surgical History[4]   Family History[5]   Social History: Short Social Hx on File[6]     Objective:   Vitals:    04/30/25 1007   BP: 100/67   Pulse: 67   Resp: 19   Temp: 97 °F (36.1 °C)       Physical Exam  Constitutional:       General: She is not in acute distress.     Appearance: She is ill-appearing.   HENT:      Right Ear: Tympanic membrane normal.      Left Ear: Tympanic membrane normal.      Nose: Congestion present.      Right Turbinates: Enlarged.      Left Turbinates: Enlarged.      Right Sinus: Maxillary sinus tenderness and frontal sinus tenderness present.      Left Sinus: Maxillary sinus tenderness and frontal sinus tenderness present.      Comments: Erythematous nasal tissue.     Mouth/Throat:      Mouth: Mucous membranes are moist.   Cardiovascular:      Rate and Rhythm: Normal rate and regular rhythm.      Heart sounds: Normal heart sounds.   Pulmonary:      Breath sounds: Normal breath sounds. No wheezing, rhonchi or rales.   Neurological:      Mental Status: She is alert and oriented to person, place, and time.         Assessment & Plan:   1. Acute rhinosinusitis  The following has been prescribed.   See patient instructions.  - fexofenadine-pseudoephedrine ER  MG Oral Tablet 12 Hr; Take 1 tablet by mouth 2 (two) times daily.  Dispense: 30 tablet; Refill: 0  - amoxicillin clavulanate 875-125 MG Oral Tab; Take 1 tablet by mouth 2 (two) times daily for 10 days.  Dispense: 20 tablet; Refill: 0      No orders of the defined types were placed in this encounter.      Meds This Visit:  Requested Prescriptions      No prescriptions requested or ordered in this encounter       Imaging & Referrals:  None     Patient Instructions   Medication reviewed and renewed where needed and appropriate.  Comply with medications.  Augmentin 875 mg to be taken twice a day for 10 days and the course to be completed.  Allegra-D 12-hour also prescribed in order to reduce congestion and sinus discomfort and also eliminate postnasal drip.    Return if symptoms worsen or fail to improve.         [1]   Current Outpatient Medications   Medication Sig Dispense Refill    sertraline 50 MG Oral Tab Take 1 tablet (50 mg total) by mouth every morning.      Probiotic Product (PROBIOTIC OR) Take by mouth.      hydrOXYzine 25 MG Oral Tab TAKE 1 OR 2 TABLETS NIGHTLY ON AN AS NEEDED BASIS FOR INSOMNIA. (Patient not taking: Reported on 2025)     [2]   Allergies  Allergen Reactions    Dust Mite Extract      CATS   [3]   Past Medical History:   Anemia    Anxiety state    History of pregnancy     x2    Hx of pregnancy     x2    Hypoglycemia    Raynaud phenomenon   [4]   Past Surgical History:  Procedure Laterality Date    Colonoscopy N/A 2025    Procedure: COLONOSCOPY;  Surgeon: Zhane Felipe MD;  Location: [facility] ENDOSCOPY    Electrocardiogram, complete  01/10/2014    scanned to media tab      2010    12hr labor . 38 week 6lb 5oz Female [facility]      2013 . 1st degree perineal laceration.  Rebecca Pathak    Other surgical history  2015    Tummy Tuck    Other surgical history   01/01/2015    Urogyn repair    Other surgical history Left     cartilage tear wrist   [5]   Family History  Problem Relation Age of Onset    Depression Mother     Infectious Disease Mother         Lyme disease    Cancer Maternal Grandmother 40        PLS CONFIRM - NG SHOWS MGFA, NOT MGMA    Dementia Paternal Grandmother 89        Alzheimer's disease    Heart Disease Paternal Grandfather 70        CAD    Cancer Paternal Grandfather     Cancer Maternal Grandfather 40   [6]   Social History  Socioeconomic History    Marital status:    Tobacco Use    Smoking status: Former     Current packs/day: 0.50     Average packs/day: 0.5 packs/day for 10.0 years (5.0 ttl pk-yrs)     Types: Cigarettes    Smokeless tobacco: Never   Vaping Use    Vaping status: Never Used   Substance and Sexual Activity    Alcohol use: Yes     Alcohol/week: 1.0 - 2.0 standard drink of alcohol     Types: 1 - 2 Glasses of wine per week     Comment: daily    Drug use: No   Other Topics Concern    Caffeine Concern Yes     Comment: 2 cups of coffee daily 1 cup of soda daily and tea once rarely

## 2025-08-15 ENCOUNTER — HOSPITAL ENCOUNTER (OUTPATIENT)
Age: 45
Discharge: HOME | End: 2025-08-15

## 2025-08-15 ENCOUNTER — APPOINTMENT (OUTPATIENT)
Dept: GENERAL RADIOLOGY | Age: 45
End: 2025-08-15
Attending: NURSE PRACTITIONER

## 2025-08-15 VITALS
SYSTOLIC BLOOD PRESSURE: 98 MMHG | HEART RATE: 50 BPM | RESPIRATION RATE: 18 BRPM | OXYGEN SATURATION: 100 % | TEMPERATURE: 97 F | DIASTOLIC BLOOD PRESSURE: 53 MMHG

## 2025-08-15 DIAGNOSIS — R22.2 NODULE OF ANTERIOR CHEST WALL: ICD-10-CM

## 2025-08-15 DIAGNOSIS — S09.93XA INJURY OF FACE, INITIAL ENCOUNTER: Primary | ICD-10-CM

## 2025-08-15 PROCEDURE — 99213 OFFICE O/P EST LOW 20 MIN: CPT | Performed by: NURSE PRACTITIONER

## 2025-08-15 PROCEDURE — 70150 X-RAY EXAM OF FACIAL BONES: CPT | Performed by: NURSE PRACTITIONER

## 2025-08-23 ENCOUNTER — HOSPITAL ENCOUNTER (OUTPATIENT)
Age: 45
Discharge: HOME | End: 2025-08-23

## 2025-08-23 ENCOUNTER — PATIENT MESSAGE (OUTPATIENT)
Dept: FAMILY MEDICINE CLINIC | Facility: CLINIC | Age: 45
End: 2025-08-23

## 2025-08-23 VITALS
HEART RATE: 61 BPM | RESPIRATION RATE: 22 BRPM | TEMPERATURE: 98 F | SYSTOLIC BLOOD PRESSURE: 108 MMHG | DIASTOLIC BLOOD PRESSURE: 48 MMHG | OXYGEN SATURATION: 100 %

## 2025-08-23 DIAGNOSIS — Z20.818 STREP THROAT EXPOSURE: ICD-10-CM

## 2025-08-23 DIAGNOSIS — J02.9 ACUTE PHARYNGITIS, UNSPECIFIED ETIOLOGY: ICD-10-CM

## 2025-08-23 DIAGNOSIS — N76.0 ACUTE VAGINITIS: Primary | ICD-10-CM

## 2025-08-23 LAB
B-HCG UR QL: NEGATIVE
S PYO AG THROAT QL: NEGATIVE

## 2025-08-23 PROCEDURE — 87491 CHLMYD TRACH DNA AMP PROBE: CPT | Performed by: NURSE PRACTITIONER

## 2025-08-23 PROCEDURE — 87880 STREP A ASSAY W/OPTIC: CPT | Performed by: NURSE PRACTITIONER

## 2025-08-23 PROCEDURE — 87081 CULTURE SCREEN ONLY: CPT | Performed by: NURSE PRACTITIONER

## 2025-08-23 PROCEDURE — 81514 NFCT DS BV&VAGINITIS DNA ALG: CPT | Performed by: NURSE PRACTITIONER

## 2025-08-23 PROCEDURE — 87591 N.GONORRHOEAE DNA AMP PROB: CPT | Performed by: NURSE PRACTITIONER

## 2025-08-23 PROCEDURE — 81025 URINE PREGNANCY TEST: CPT | Performed by: NURSE PRACTITIONER

## 2025-08-23 PROCEDURE — 99214 OFFICE O/P EST MOD 30 MIN: CPT | Performed by: NURSE PRACTITIONER

## 2025-08-24 RX ORDER — FLUCONAZOLE 150 MG/1
150 TABLET ORAL ONCE
Qty: 1 TABLET | Refills: 0 | Status: SHIPPED | OUTPATIENT
Start: 2025-08-24 | End: 2025-08-24

## 2025-08-25 LAB
C TRACH DNA SPEC QL NAA+PROBE: NEGATIVE
N GONORRHOEA DNA SPEC QL NAA+PROBE: NEGATIVE

## (undated) DEVICE — 60 ML SYRINGE REGULAR TIP: Brand: MONOJECT

## (undated) DEVICE — MEDI-VAC NON-CONDUCTIVE SUCTION TUBING 6MM X 1.8M (6FT.) L: Brand: CARDINAL HEALTH

## (undated) DEVICE — Device

## (undated) DEVICE — KIT ENDO ORCAPOD 160/180/190

## (undated) DEVICE — TRAP POLYP W/ 2 SPEC TY CLR MAGNIFYING WIND

## (undated) DEVICE — LASSO POLYPECTOMY SNARE: Brand: LASSO

## (undated) DEVICE — KIT CLEAN ENDOKIT 1.1OZ GOWNX2

## (undated) DEVICE — V2 SPECIMEN COLLECTION MANIFOLD KIT: Brand: NEPTUNE

## (undated) NOTE — MR AVS SNAPSHOT
Tyler Wisdom [address]  [phone]  [phone]               Thank you for choosing us for your health care visit with Elie Gallo PTA.   We are glad to serve you and happy to provide you with [email or web address]. If you've recently had a stay at the Hospital you can access your discharge instructions in fitmob by going to Visits < Admission Summaries.  If you've been to the Emergency Department or your doctor's office, you can view yo

## (undated) NOTE — MR AVS SNAPSHOT
[address] [facility] [place]  [phone]               Thank you for choosing us for your health care visit with Alessia Cobb CNM.   We are glad to serve you and happy to provide you with this sum Therapy Goals 3/8/2017  5:57 PM by Mekhi Jones, PT     Goal Comments    1) The patient will be safe and independent with a progressive HEP necessary to manage symptoms during higher level activity     2) The patient will demonstrate 2/3 to 1 grade i

## (undated) NOTE — MR AVS SNAPSHOT
[facility]  [address], [address]  [phone]               Thank you for choosing us for your health care visit with Mark Lopez MD.  We are glad to serve you and happy to provide you with this summary o Reason for Today's Visit     Varicose Veins     Derm Problem           Medical Issues Discussed Today     Varicose vein of leg    -  Primary      Instructions and Information about Your Health     None      Allergies as of May 19, 2017     No Known Allergi For medical emergencies, dial 911.            Visit [facility] online at  [email or web address]

## (undated) NOTE — MR AVS SNAPSHOT
Tyler Wisdom [address]  [phone]  [phone]               Thank you for choosing us for your health care visit with Alfreda Dunlap PTA.   We are glad to serve you and happy to provide you with [address]   [phone]           Please arrive at your scheduled appointment time. Wear comfortable, loose fitting clothing.               MyChart     Visit [facility]  You can access your MyChart to more actively manage your h

## (undated) NOTE — MR AVS SNAPSHOT
Tyler Wisdom [address]  [phone]  [phone]               Thank you for choosing us for your health care visit with Jose Allen PTA.   We are glad to serve you and happy to provide you with Summaries. If you've been to the Emergency Department or your doctor's office, you can view your past visit information in [employer] by going to Visits < Visit Summaries. [employer] questions? Call [phone] for help.   [employer] is NOT to be used for urge

## (undated) NOTE — LETTER
[address], [place], [place]    Authorization for Surgical Operation and Procedure                               I hereby authorize Zhane Felipe MD, my physician and his/her assistants (if applicable), which may include medical students, residents, and/or fellows, to perform the following surgical operation/ procedure and administer such anesthesia as may be determined necessary by my physician: Operation/Procedure name (s) COLONOSCOPY on Ashley Aparicioan   2.   I recognize that during the surgical operation/procedure, unforeseen conditions may necessitate additional or different procedures than those listed above.  I, therefore, further authorize and request that the above-named surgeon, assistants, or designees perform such procedures as are, in their judgment, necessary and desirable.    3.   My surgeon/physician has discussed prior to my surgery the potential benefits, risks and side effects of this procedure; the likelihood of achieving goals; and potential problems that might occur during recuperation.  They also discussed reasonable alternatives to the procedure, including risks, benefits, and side effects related to the alternatives and risks related to not receiving this procedure.  I have had all my questions answered and I acknowledge that no guarantee has been made as to the result that may be obtained.    4.   Should the need arise during my operation/procedure, which includes change of level of care prior to discharge, I also consent to the administration of blood and/or blood products.  Further, I understand that despite careful testing and screening of blood or blood products by collecting agencies, I may still be subject to ill effects as a result of receiving a blood transfusion and/or blood products.  The following are some, but not all, of the potential risks that can occur: fever and allergic reactions, hemolytic reactions, transmission of diseases such as  Hepatitis, AIDS and Cytomegalovirus (CMV) and fluid overload.  In the event that I wish to have an autologous transfusion of my own blood, or a directed donor transfusion, I will discuss this with my physician.  Check only if Refusing Blood or Blood Products  I understand refusal of blood or blood products as deemed necessary by my physician may have serious consequences to my condition to include possible death. I hereby assume responsibility for my refusal and release the hospital, its personnel, and my physicians from any responsibility for the consequences of my refusal.    o  Refuse   5.   I authorize the use of any specimen, organs, tissues, body parts or foreign objects that may be removed from my body during the operation/procedure for diagnosis, research or teaching purposes and their subsequent disposal by hospital authorities.  I also authorize the release of specimen test results and/or written reports to my treating physician on the hospital medical staff or other referring or consulting physicians involved in my care, at the discretion of the Pathologist or my treating physician.    6.   I consent to the photographing or videotaping of the operations or procedures to be performed, including appropriate portions of my body for medical, scientific, or educational purposes, provided my identity is not revealed by the pictures or by descriptive texts accompanying them.  If the procedure has been photographed/videotaped, the surgeon will obtain the original picture, image, videotape or CD.  The hospital will not be responsible for storage, release or maintenance of the picture, image, tape or CD.    7.   I consent to the presence of a  or observers in the operating room as deemed necessary by my physician or their designees.    8.   I recognize that in the event my procedure results in extended X-Ray/fluoroscopy time, I may develop a skin reaction.    9. If I have a Do Not Attempt  Resuscitation (DNAR) order in place, that status will be suspended while in the operating room, procedural suite, and during the recovery period unless otherwise explicitly stated by me (or a person authorized to consent on my behalf). The surgeon or my attending physician will determine when the applicable recovery period ends for purposes of reinstating the DNAR order.  10. Patients having a sterilization procedure: I understand that if the procedure is successful the results will be permanent and it will therefore be impossible for me to inseminate, conceive, or bear children.  I also understand that the procedure is intended to result in sterility, although the result has not been guaranteed.   11. I acknowledge that my physician has explained sedation/analgesia administration to me including the risk and benefits I consent to the administration of sedation/analgesia as may be necessary or desirable in the judgment of my physician.    I CERTIFY THAT I HAVE READ AND FULLY UNDERSTAND THE ABOVE CONSENT TO OPERATION and/or OTHER PROCEDURE.     ____________________________________  _________________________________        ______________________________  Signature of Patient    Signature of Responsible Person                Printed Name of Responsible Person                                      ____________________________________  _____________________________                ________________________________  Signature of Witness        Date  Time         Relationship to Patient    STATEMENT OF PHYSICIAN My signature below affirms that prior to the time of the procedure; I have explained to the patient and/or his/her legal representative, the risks and benefits involved in the proposed treatment and any reasonable alternative to the proposed treatment. I have also explained the risks and benefits involved in refusal of the proposed treatment and alternatives to the proposed treatment and have answered the patient's  questions. If I have a significant financial interest in a co-management agreement or a significant financial interest in any product or implant, or other significant relationship used in this procedure/surgery, I have disclosed this and had a discussion with my patient.     _____________________________________________________              _____________________________  (Signature of Physician)                                                                                         (Date)                                   (Time)  Patient Name: Ashley Pardo      : 1980      Printed: 1/15/2025     Medical Record #: [id number]                                      Page 1 of 1

## (undated) NOTE — MR AVS SNAPSHOT
Tyler Wisdom [address]  [phone]  [phone]               Thank you for choosing us for your health care visit with Bijan Alvarado PTA.   We are glad to serve you and happy to provide you with [address]  [employer]   [phone]           Please arrive at your scheduled appointment time. Wear comfortable, loose fitting clothing.             Mar 28, 2017  9:00 AM   [place] [facility] Visit By Therapist with Janet Funk,

## (undated) NOTE — MR AVS SNAPSHOT
Tyler Wisdom [address]  [phone]  [phone]               Thank you for choosing us for your health care visit with Alfreda Dunlap PTA.   We are glad to serve you and happy to provide you with [address]   [phone]           Please arrive at your scheduled appointment time. Wear comfortable, loose fitting clothing.             Apr 05, 2017  2:45 PM   [place] [facility] Visit By Therapist with Alexus Suh

## (undated) NOTE — MR AVS SNAPSHOT
[address] [facility]  [phone]               Thank you for choosing us for your health care visit with Cesar Eden CNM.   We are glad to serve you and happy to provide you with this sum Apr 05, 2017  2:45 PM   [place] [facility] Visit By Therapist with Romero Fitch, [facility] ([facility])    [address] [address]   [phone]           Please arrive at 1) The patient will be safe and independent with a progressive HEP necessary to manage symptoms during higher level activity     2) The patient will demonstrate 2/3 to 1 grade increase in R knee extension eccentric strength necessary for the stability to

## (undated) NOTE — MR AVS SNAPSHOT
[address]  [phone]               Thank you for choosing us for your health care visit with Tamika Maloney MD.  We are glad to serve you and happy to provide you with this marin insurance company's guidelines for prior authorization for this test.  You may be held responsible for payment in full if proper authorization is not acquired.   Please contact the Patient Business Office at [phone] if you have any questions related to [facility], [address] [place] [address]        2 times a week for 4 weeks  Focus on quad, hamstring, and IT band stretching/strengthening, core and pelvic stabilization, and increasing ROM. Modalities prn.   HEP    Assoc Dx:  Patella, chondromalacia, left [M22.42]          Reas office, you can view your past visit information in [employer] by going to Visits < Visit Summaries. [employer] questions? Call [phone] for help. [employer] is NOT to be used for urgent needs. For medical emergencies, dial 911.            Visit [facility]

## (undated) NOTE — LETTER
9/20/2018              [address] [facility] [address] [id number]         Dear Kassy Boykin,    This letter is to inform you that our office has made several attempts to reach you by phone without success.   We were attempting to contact y

## (undated) NOTE — LETTER
[place] [address], [place], [place]       INFORMED REFUSAL FOR TREATMENT / PROCEDURE / TESTING      I have been advised by  _Ma__ that it is necessary for me to undergo the following treatment, procedure or testing.  The treatment, procedure or test is as follows:    ____________Urine Pregnancy Test_____________      _____________________________________________________________________    The need for this treatment, procedures and/or testing, its benefits and risks, and alternatives has been explained to me by my physician.  I understand that my refusal to consent to the recommended medical treatment or testing may seriously impair my health and even jeopardize my life.  While I understand the possible consequences, I nevertheless refuse to submit to the recommended treatment, procedure or testing against medical advice.  I assume responsibility for the consequences of this refusal and release [facility], [facility] ([facility]), it affiliates and subsidiaries, its employees and agents, and the physician, from any and all liability associated with my refusal to follow the medical advice and permit treatment, procedure or testing.          ________________________________________  (Patient Signature)      Ashley Pardo  (Patient Name, Printed)    ________________________________________  (Date)                  Patient Name: Ashley Pardo     : 1980                 Printed: 2025      Medical Record #: [id number]                                              Page 1 of

## (undated) NOTE — LETTER
July 26, 2017         DO Davis Finnegan [address] [facility]      Patient: Lupe [address]   YOB: 1980   Date of Visit: 7/26/2017       Dear Dr. Oralia Hartmann,    I saw your patient, Lupe Marshall, on 7/

## (undated) NOTE — MR AVS SNAPSHOT
Tyler Wisdom [address]  [facility] [address]  [phone]               Thank you for choosing us for your health care visit with Felicia Joshua PT.   We are glad to serve you and happy to provide you wi [address] [facility]   [phone]           Please arrive at your scheduled appointment time. Wear comfortable, loose fitting clothing.             Mar 23, 2017  2:15 PM   [place] [facility] Visit By Therapist with Sabra Conner